# Patient Record
Sex: MALE | Race: WHITE | Employment: OTHER | URBAN - METROPOLITAN AREA
[De-identification: names, ages, dates, MRNs, and addresses within clinical notes are randomized per-mention and may not be internally consistent; named-entity substitution may affect disease eponyms.]

---

## 2017-01-16 ENCOUNTER — OFFICE VISIT (OUTPATIENT)
Dept: FAMILY MEDICINE CLINIC | Age: 72
End: 2017-01-16

## 2017-01-16 VITALS
RESPIRATION RATE: 18 BRPM | BODY MASS INDEX: 37.51 KG/M2 | HEIGHT: 70 IN | WEIGHT: 262 LBS | SYSTOLIC BLOOD PRESSURE: 128 MMHG | DIASTOLIC BLOOD PRESSURE: 72 MMHG | TEMPERATURE: 96.9 F | OXYGEN SATURATION: 96 % | HEART RATE: 80 BPM

## 2017-01-16 DIAGNOSIS — E78.2 MIXED HYPERLIPIDEMIA: ICD-10-CM

## 2017-01-16 DIAGNOSIS — M75.41 IMPINGEMENT SYNDROME OF SHOULDER REGION, RIGHT: Primary | ICD-10-CM

## 2017-01-16 DIAGNOSIS — F51.01 PRIMARY INSOMNIA: ICD-10-CM

## 2017-01-16 RX ORDER — DOXEPIN HYDROCHLORIDE 25 MG/1
25 CAPSULE ORAL
Qty: 90 CAP | Refills: 0 | Status: SHIPPED | OUTPATIENT
Start: 2017-01-16 | End: 2017-05-31

## 2017-01-16 RX ORDER — ACETAMINOPHEN 500 MG
TABLET ORAL
COMMUNITY

## 2017-01-16 RX ORDER — SIMVASTATIN 20 MG/1
20 TABLET, FILM COATED ORAL
Qty: 90 TAB | Refills: 1 | Status: SHIPPED | OUTPATIENT
Start: 2017-01-16 | End: 2018-01-15 | Stop reason: SDUPTHER

## 2017-01-16 NOTE — PROGRESS NOTES
1. Have you been to the ER, urgent care clinic since your last visit? Hospitalized since your last visit? No   2. Have you seen or consulted any other health care providers outside of the [facility] since your last visit? Include any pap smears or colon screening.    No      Last eye exam 3 years ago

## 2017-01-16 NOTE — PROGRESS NOTES
HISTORY OF PRESENT ILLNESS  Mamta Pedro is a 66 y.o. male. HPI  C/o right shoulder pain is not better since last visit, now both shoulders hurts with activity for 3 weeks, no pain at rest, pain with activity is 6-9/10 depends what he does, he tried voltaren but it did not help    Insomnia, stable on Doxipen prn    HLD, stable on zocor daily, last LDl was 69, need refills  Review of Systems   Constitutional: Negative for chills and fever. Respiratory: Negative for cough and shortness of breath. Cardiovascular: Negative for chest pain and leg swelling. Musculoskeletal: Negative for falls and neck pain. Skin: Negative for rash. Neurological: Negative for dizziness, tingling and headaches. Physical Exam   Constitutional: He is oriented to person, place, and time. He appears well-developed and well-nourished. Cardiovascular: Normal rate, regular rhythm and normal heart sounds. Pulmonary/Chest: Effort normal and breath sounds normal.   Abdominal: Soft. There is no tenderness. Musculoskeletal:        Right shoulder: He exhibits decreased range of motion and tenderness (abduction is limited to 90 degrees due to pain, emty can sign positive, drop arm negative). Left shoulder: He exhibits tenderness (anterior/lateral, empty can sign positive, abduction to more than 90 degrees cause pain, drop arm test negative). He exhibits normal range of motion and no deformity. Neurological: He is alert and oriented to person, place, and time. Skin: No rash noted. Psychiatric: He has a normal mood and affect. His behavior is normal. Judgment and thought content normal.   Vitals reviewed. ASSESSMENT and PLAN  Rudi Kawasaki was seen today for shoulder pain and follow up chronic condition.     Diagnoses and all orders for this visit:    Impingement syndrome of shoulder region, right, discussed options with pt, he wanted steroid injection  -     METHYLPREDNISOLONE ACETATE INJECTION 80 MG  -     DRAIN/INJECT LARGE JOINT/BURSA  - using sterile fashion, the right subacromial space was injected with 80 mg depomedrol/2 cc lidocaine, pt tolerated procedure well, pain level improved after the procedure to 4/10    [facility]  OFFICE PROCEDURE PROGRESS NOTE        Chart reviewed for the following:   Manuel Feldman MD, have reviewed the History, Physical and updated the Allergic reactions for [address] performed immediately prior to start of procedure:   Manuel Feldman MD, have performed the following reviews on Maureen Ferrari prior to the start of the procedure:            * Patient was identified by name and date of birth   * Agreement on procedure being performed was verified  * Risks and Benefits explained to the patient  * Procedure site verified and marked as necessary  * Patient was positioned for comfort  * Consent was signed and verified     Date of procedure:1/16/2017   Time of procedure: 11:11 am  Procedure performed by:  Jason Damon MD    Provider assisted by: Saba    Patient assisted by: self    Pt tolerated the procedure well with no complications    Pt was advised to contact our office immediately if redness/pain or fever develops, Pt verbalized understanding        Primary insomnia, stable  -     doxepin (SINEQUAN) 25 mg capsule; Take 1 Cap by mouth nightly. Mixed hyperlipidemia, stable  -     simvastatin (ZOCOR) 20 mg tablet; Take 1 Tab by mouth nightly. -     LIPID PANEL; Future  -     METABOLIC PANEL, COMPREHENSIVE; Future  -     CBC WITH AUTOMATED DIFF;  Future    Lab Results   Component Value Date/Time    Cholesterol, total 151 09/28/2016 11:02 AM    HDL Cholesterol 48 09/28/2016 11:02 AM    LDL, calculated 69.2 09/28/2016 11:02 AM    VLDL, calculated 33.8 09/28/2016 11:02 AM    Triglyceride 169 09/28/2016 11:02 AM    CHOL/HDL Ratio 3.1 09/28/2016 11:02 AM

## 2017-01-16 NOTE — PATIENT INSTRUCTIONS
Joint Injections: Care Instructions  Your Care Instructions  Joint injections are shots into a joint, such as the knee. They may be used to put in medicines, such as pain relievers. Or they can be used to take out fluid. Sometimes the fluid is tested in a lab. This can help find the cause of a joint problem. A corticosteroid, or steroid, shot is used to reduce inflammation in tendons or joints. It is often used to treat problems such as arthritis, tendinitis, and bursitis. Steroids can be injected directly into a painful, inflamed joint. They can also help reduce inflammation of a bursa. A bursa is a sac of fluid. It cushions and lubricates areas where tendons, ligaments, skin, muscles, or bones rub against each other. A steroid shot can sometimes help with short-term pain relief when other treatments haven't worked. If steroid shots help, pain may improve for weeks or months. Follow-up care is a key part of your treatment and safety. Be sure to make and go to all appointments, and call your doctor if you are having problems. It's also a good idea to know your test results and keep a list of the medicines you take. How can you care for yourself at home? · Put ice or a cold pack on the area for 10 to 20 minutes at a time. Put a thin cloth between the ice and your skin. · Take anti-inflammatory medicines to reduce pain, swelling, or inflammation. These include ibuprofen (Advil, Motrin) and naproxen (Aleve). Read and follow all instructions on the label. · Avoid strenuous activities for several days, especially those that put stress on the area where you got the shot. · If you have dressings over the area, keep them clean and dry. You may remove them when your doctor tells you to. When should you call for help? Call your doctor now or seek immediate medical care if:  · You have signs of infection, such as:  ¨ Increased pain, swelling, warmth, or redness. ¨ Red streaks leading from the site.   ¨ Pus draining from the site. ¨ A fever. Watch closely for changes in your health, and be sure to contact your doctor if you have any problems. Where can you learn more? Go to http://mirian-roz.info/. Enter N616 in the search box to learn more about \"Joint Injections: Care Instructions. \"  Current as of: May 23, 2016  Content Version: 11.1  © 9498-0188 [employer]. Care instructions adapted under license by [employer] (which disclaims liability or warranty for this information). If you have questions about a medical condition or this instruction, always ask your healthcare professional. [address] any warranty or liability for your use of this information.

## 2017-01-16 NOTE — MR AVS SNAPSHOT
Visit Information Date & Time Provider Department Dept. Phone Encounter #  
 1/16/2017 10:45 AM Saroj Negron MD [facility] [phone] [id number] Your Appointments 1/31/2017 11:00 AM  
Office Visit with Saroj Negron MD  
[facility] [address] [facility]) Appt Note:   
 [address] [facility] [address] [place] [id number]  
[phone]  
  
   
 [address] 4/26/2017  9:15 AM  
ESTABLISHED PATIENT with Adeola Treviño MD  
[facility] [place] Appt Note: Return in about 6 months (around 4/26/2017) for Flow/PVR, BPH  
 [address] [facility] [address] [address]  
[phone]  
  
   
 [facility] [id number] Upcoming Health Maintenance Date Due DTaP/Tdap/Td series (1 - Tdap) 11/27/1965 FOBT Q 1 YEAR AGE 50-75 11/27/1994 ZOSTER VACCINE AGE 60> 11/27/2004 GLAUCOMA SCREENING Q2Y 11/27/2009 MEDICARE YEARLY EXAM 11/27/2009 INFLUENZA AGE 9 TO ADULT 8/1/2016 Pneumococcal 65+ Low/Medium Risk (2 of 2 - PPSV23) 10/26/2017 Allergies as of 1/16/2017  Review Complete On: 1/16/2017 By: Saroj Negron MD  
  
 Severity Noted Reaction Type Reactions Aspirin High 09/23/2016   Intolerance Rash Penicillins High 09/23/2016   Intolerance Unknown (comments) Peanut Medium 09/23/2016   Topical Itching Current Immunizations  Never Reviewed No immunizations on file. Not reviewed this visit You Were Diagnosed With   
  
 Codes Comments Impingement syndrome of shoulder region, right    -  Primary ICD-10-CM: M75.41 
ICD-9-CM: 726.2 Primary insomnia     ICD-10-CM: F51.01 
ICD-9-CM: 307.42 Mixed hyperlipidemia     ICD-10-CM: E78.2 ICD-9-CM: 272.2 Vitals BP Pulse Temp Resp Height(growth percentile) Weight(growth percentile) 128/72 (BP 1 Location: Left arm, BP Patient Position: Sitting) 80 96.9 °F (36.1 °C) (Oral) 18 5' 10\" (1.778 m) 262 lb (118.8 kg) SpO2 BMI Smoking Status 96% 37.59 kg/m2 Light Tobacco Smoker Vitals History BMI and BSA Data Body Mass Index Body Surface Area  
 37.59 kg/m 2 2.42 m 2 Preferred Pharmacy Pharmacy Name Phone [facility] [address] [place] [address],  [place] Your Updated Medication List  
  
   
This list is accurate as of: 1/16/17 11:24 AM.  Always use your most recent med list.  
  
  
  
  
 CENTRUM COMPLETE PO Take  by mouth. diclofenac EC 75 mg EC tablet Commonly known as:  VOLTAREN Take 1 Tab by mouth two (2) times a day. doxepin 25 mg capsule Commonly known as:  SINEquan Take 1 Cap by mouth nightly. simvastatin 20 mg tablet Commonly known as:  ZOCOR Take 1 Tab by mouth nightly. tamsulosin 0.4 mg capsule Commonly known as:  FLOMAX Take 0.4 mg by mouth daily. TYLENOL EXTRA STRENGTH 500 mg tablet Generic drug:  acetaminophen Take  by mouth every six (6) hours as needed for Pain. Prescriptions Sent to Pharmacy Refills  
 doxepin (SINEQUAN) 25 mg capsule 0 Sig: Take 1 Cap by mouth nightly. Class: Normal  
 Pharmacy: [address] [place] [address], [facility] [address] Ph #: [phone] Route: Oral  
 simvastatin (ZOCOR) 20 mg tablet 1 Sig: Take 1 Tab by mouth nightly. Class: Normal  
 Pharmacy: [address] [place] [address] [facility] Ph #: [phone] Route: Oral  
  
We Performed the Following DRAIN/INJECT LARGE JOINT/BURSA [id number] CPT(R)] METHYLPREDNISOLONE ACETATE INJECTION 80 MG [ [facility]] To-Do List   
 01/16/2017 Lab:  CBC WITH AUTOMATED DIFF   
  
 01/16/2017 Lab:  LIPID PANEL   
  
 01/16/2017 Lab:  METABOLIC PANEL, COMPREHENSIVE Patient Instructions Joint Injections: Care Instructions Your Care Instructions Joint injections are shots into a joint, such as the knee. They may be used to put in medicines, such as pain relievers. Or they can be used to take out fluid. Sometimes the fluid is tested in a lab. This can help find the cause of a joint problem. A corticosteroid, or steroid, shot is used to reduce inflammation in tendons or joints. It is often used to treat problems such as arthritis, tendinitis, and bursitis. Steroids can be injected directly into a painful, inflamed joint. They can also help reduce inflammation of a bursa. A bursa is a sac of fluid. It cushions and lubricates areas where tendons, ligaments, skin, muscles, or bones rub against each other. A steroid shot can sometimes help with short-term pain relief when other treatments haven't worked. If steroid shots help, pain may improve for weeks or months. Follow-up care is a key part of your treatment and safety. Be sure to make and go to all appointments, and call your doctor if you are having problems. It's also a good idea to know your test results and keep a list of the medicines you take. How can you care for yourself at home? · Put ice or a cold pack on the area for 10 to 20 minutes at a time. Put a thin cloth between the ice and your skin. · Take anti-inflammatory medicines to reduce pain, swelling, or inflammation. These include ibuprofen (Advil, Motrin) and naproxen (Aleve). Read and follow all instructions on the label. · Avoid strenuous activities for several days, especially those that put stress on the area where you got the shot. · If you have dressings over the area, keep them clean and dry. You may remove them when your doctor tells you to. When should you call for help? Call your doctor now or seek immediate medical care if: 
· You have signs of infection, such as: 
¨ Increased pain, swelling, warmth, or redness. ¨ Red streaks leading from the site. ¨ Pus draining from the site. ¨ A fever. Watch closely for changes in your health, and be sure to contact your doctor if you have any problems. Where can you learn more? Go to http://mirian-roz.info/. Enter N616 in the search box to learn more about \"Joint Injections: Care Instructions. \" Current as of: May 23, 2016 Content Version: 11.1 © 4856-8949 [employer]. Care instructions adapted under license by [employer] (which disclaims liability or warranty for this information). If you have questions about a medical condition or this instruction, always ask your healthcare professional. [address] any warranty or liability for your use of this information. Introducing [facility]! Francis Desir introduces zumatek patient portal. Now you can access parts of your medical record, email your doctor's office, and request medication refills online. 1. In your internet browser, go to [email or web address] 2. Click on the First Time User? Click Here link in the Sign In box. You will see the New Member Sign Up page. 3. Enter your zumatek Access Code exactly as it appears below. You will not need to use this code after youve completed the sign-up process. If you do not sign up before the expiration date, you must request a new code. · zumatek Access Code: [email or web address] Expires: 4/16/2017 11:24 AM 
 
4. Enter the last four digits of your Social Security Number (xxxx) and Date of Birth (mm/dd/yyyy) as indicated and click Submit. You will be taken to the next sign-up page. 5. Create a zumatek ID. This will be your zumatek login ID and cannot be changed, so think of one that is secure and easy to remember. 6. Create a zumatek password. You can change your password at any time. 7. Enter your Password Reset Question and Answer. This can be used at a later time if you forget your password. 8. Enter your e-mail address. You will receive e-mail notification when new information is available in [address] 9. Click Sign Up. You can now view and download portions of your medical record. 10. Click the Download Summary menu link to download a portable copy of your medical information. If you have questions, please visit the Frequently Asked Questions section of the [employer] website. Remember, [employer] is NOT to be used for urgent needs. For medical emergencies, dial 911. Now available from your iPhone and Android! Please provide this summary of care documentation to your next provider. Your primary care clinician is listed as Niko Vallejo. If you have any questions after today's visit, please call [phone].

## 2017-01-23 ENCOUNTER — HOSPITAL ENCOUNTER (OUTPATIENT)
Dept: LAB | Age: 72
Discharge: HOME | End: 2017-01-23
Payer: MEDICARE

## 2017-01-23 DIAGNOSIS — E78.2 MIXED HYPERLIPIDEMIA: ICD-10-CM

## 2017-01-23 LAB
ALBUMIN SERPL BCP-MCNC: 3.7 G/DL (ref 3.4–5)
ALBUMIN/GLOB SERPL: 1.4 {RATIO} (ref 0.8–1.7)
ALP SERPL-CCNC: 81 U/L (ref 45–117)
ALT SERPL-CCNC: 20 U/L (ref 16–61)
ANION GAP BLD CALC-SCNC: 8 MMOL/L (ref 3–18)
AST SERPL W P-5'-P-CCNC: 10 U/L (ref 15–37)
BASOPHILS # BLD AUTO: 0 K/UL (ref 0–0.06)
BASOPHILS # BLD: 0 % (ref 0–2)
BILIRUB SERPL-MCNC: 0.4 MG/DL (ref 0.2–1)
BUN SERPL-MCNC: 14 MG/DL (ref 7–18)
BUN/CREAT SERPL: 17 (ref 12–20)
CALCIUM SERPL-MCNC: 9 MG/DL (ref 8.5–10.1)
CHLORIDE SERPL-SCNC: 106 MMOL/L (ref 100–108)
CHOLEST SERPL-MCNC: 148 MG/DL
CO2 SERPL-SCNC: 26 MMOL/L (ref 21–32)
CREAT SERPL-MCNC: 0.82 MG/DL (ref 0.6–1.3)
DIFFERENTIAL METHOD BLD: ABNORMAL
EOSINOPHIL # BLD: 0.1 K/UL (ref 0–0.4)
EOSINOPHIL NFR BLD: 1 % (ref 0–5)
ERYTHROCYTE [DISTWIDTH] IN BLOOD BY AUTOMATED COUNT: 13.3 % (ref 11.6–14.5)
GLOBULIN SER CALC-MCNC: 2.6 G/DL (ref 2–4)
GLUCOSE SERPL-MCNC: 81 MG/DL (ref 74–99)
HCT VFR BLD AUTO: 49.7 % (ref 36–48)
HDLC SERPL-MCNC: 59 MG/DL (ref 40–60)
HDLC SERPL: 2.5 {RATIO} (ref 0–5)
HGB BLD-MCNC: 16.8 G/DL (ref 13–16)
LDLC SERPL CALC-MCNC: 72 MG/DL (ref 0–100)
LIPID PROFILE,FLP: NORMAL
LYMPHOCYTES # BLD AUTO: 29 % (ref 21–52)
LYMPHOCYTES # BLD: 2.3 K/UL (ref 0.9–3.6)
MCH RBC QN AUTO: 33.1 PG (ref 24–34)
MCHC RBC AUTO-ENTMCNC: 33.8 G/DL (ref 31–37)
MCV RBC AUTO: 97.8 FL (ref 74–97)
MONOCYTES # BLD: 0.7 K/UL (ref 0.05–1.2)
MONOCYTES NFR BLD AUTO: 8 % (ref 3–10)
NEUTS SEG # BLD: 4.8 K/UL (ref 1.8–8)
NEUTS SEG NFR BLD AUTO: 62 % (ref 40–73)
PLATELET # BLD AUTO: 250 K/UL (ref 135–420)
PMV BLD AUTO: 10.6 FL (ref 9.2–11.8)
POTASSIUM SERPL-SCNC: 4.1 MMOL/L (ref 3.5–5.5)
PROT SERPL-MCNC: 6.3 G/DL (ref 6.4–8.2)
RBC # BLD AUTO: 5.08 M/UL (ref 4.7–5.5)
SODIUM SERPL-SCNC: 140 MMOL/L (ref 136–145)
TRIGL SERPL-MCNC: 85 MG/DL (ref ?–150)
VLDLC SERPL CALC-MCNC: 17 MG/DL
WBC # BLD AUTO: 7.8 K/UL (ref 4.6–13.2)

## 2017-01-23 PROCEDURE — 80053 COMPREHEN METABOLIC PANEL: CPT | Performed by: INTERNAL MEDICINE

## 2017-01-23 PROCEDURE — 80061 LIPID PANEL: CPT | Performed by: INTERNAL MEDICINE

## 2017-01-23 PROCEDURE — 85025 COMPLETE CBC W/AUTO DIFF WBC: CPT | Performed by: INTERNAL MEDICINE

## 2017-01-23 PROCEDURE — 36415 COLL VENOUS BLD VENIPUNCTURE: CPT | Performed by: INTERNAL MEDICINE

## 2017-01-31 ENCOUNTER — OFFICE VISIT (OUTPATIENT)
Dept: FAMILY MEDICINE CLINIC | Age: 72
End: 2017-01-31

## 2017-01-31 VITALS
BODY MASS INDEX: 37.51 KG/M2 | WEIGHT: 262 LBS | SYSTOLIC BLOOD PRESSURE: 116 MMHG | DIASTOLIC BLOOD PRESSURE: 77 MMHG | OXYGEN SATURATION: 95 % | RESPIRATION RATE: 18 BRPM | TEMPERATURE: 98.1 F | HEART RATE: 75 BPM | HEIGHT: 70 IN

## 2017-01-31 DIAGNOSIS — Z13.39 SCREENING FOR ALCOHOLISM: ICD-10-CM

## 2017-01-31 DIAGNOSIS — M25.512 LEFT SHOULDER PAIN, UNSPECIFIED CHRONICITY: ICD-10-CM

## 2017-01-31 DIAGNOSIS — Z23 ENCOUNTER FOR IMMUNIZATION: ICD-10-CM

## 2017-01-31 DIAGNOSIS — Z13.5 GLAUCOMA SCREENING: ICD-10-CM

## 2017-01-31 DIAGNOSIS — Z00.00 ROUTINE GENERAL MEDICAL EXAMINATION AT A HEALTH CARE FACILITY: Primary | ICD-10-CM

## 2017-01-31 NOTE — PROGRESS NOTES
This is a Subsequent Medicare Annual Wellness Visit providing Personalized Prevention Plan Services (PPPS) (Performed 12 months after initial AWV and PPPS )    I have reviewed the patient's medical history in detail and updated the computerized patient record. History     Past Medical History   Diagnosis Date    Allergic rhinitis due to allergen     Benign prostatic hypertrophy (BPH) with nocturia     BPH (benign prostatic hyperplasia)     Dermatitis     Hypercholesterolemia     Mixed hyperlipidemia     Primary insomnia     Weak urinary stream       Past Surgical History   Procedure Laterality Date    Hx orthopaedic       knee    Hx heent       wisdom    Hx heent       tonsillectomy     Current Outpatient Prescriptions   Medication Sig Dispense Refill    acetaminophen (TYLENOL EXTRA STRENGTH) 500 mg tablet Take  by mouth every six (6) hours as needed for Pain.  simvastatin (ZOCOR) 20 mg tablet Take 1 Tab by mouth nightly. 90 Tab 1    diclofenac EC (VOLTAREN) 75 mg EC tablet Take 1 Tab by mouth two (2) times a day. 60 Tab 0    MULTIVITAMIN/IRON/FOLIC ACID (CENTRUM COMPLETE PO) Take  by mouth.  tamsulosin (FLOMAX) 0.4 mg capsule Take 0.4 mg by mouth daily.  doxepin (SINEQUAN) 25 mg capsule Take 1 Cap by mouth nightly.  90 Cap 0     Allergies   Allergen Reactions    Aspirin Rash    Penicillins Unknown (comments)    Peanut Itching     Family History   Problem Relation Age of Onset    Diabetes Mother     Glaucoma Mother     Diabetes Brother      Social History   Substance Use Topics    Smoking status: Light Tobacco Smoker     Packs/day: 0.25    Smokeless tobacco: Never Used    Alcohol use Yes      Comment: occasional     Patient Active Problem List   Diagnosis Code    Benign non-nodular prostatic hyperplasia without lower urinary tract symptoms N40.0    Mixed hyperlipidemia E78.2    Primary osteoarthritis of both knees M17.0       Depression Risk Factor Screening:     PHQ 2 / 9, over the last two weeks 1/31/2017   Little interest or pleasure in doing things Not at all   Feeling down, depressed or hopeless Not at all   Total Score PHQ 2 0     Alcohol Risk Factor Screening: On any occasion during the past 3 months, have you had more than 4 drinks containing alcohol? No    Do you average more than 14 drinks per week? No      Functional Ability and Level of Safety:     Hearing Loss   no    Activities of Daily Living   Self-care. Requires assistance with: no ADLs    Fall Risk     Fall Risk Assessment, last 12 mths 1/31/2017   Able to walk? Yes   Fall in past 12 months? No     Abuse Screen   Patient is not abused    Review of Systems       Physical Examination     Evaluation of Cognitive Function:  Mood/affect:  happy  Appearance: age appropriate, casually dressed and within normal Limits  Family member/caregiver input:         Patient Care Team:  Viviana Cardona MD as PCP - General (Internal Medicine)    Advice/Referrals/Counseling   Education and counseling provided:  Are appropriate based on today's review and evaluation  End-of-Life planning (with patient's consent), discussed AMD, pt will discuss with his wife and complete later  Pneumococcal Vaccine, Rx given for Prevnar  Influenza Vaccine, up to date  Screening for glaucoma, ref to [facility]    Assessment/Plan   Ingris Colby was seen today for annual wellness visit. Diagnoses and all orders for this visit:    Glaucoma screening  -     REFERRAL TO OPHTHALMOLOGY    Routine general medical examination at a health care facility    Screening for alcoholism    Encounter for immunization  -     pneumococcal 13 vicky conj dip (PREVNAR 13, PF,) 0.5 mL syrg injection; 0.5 mL by IntraMUSCular route once for 1 dose.  -     varicella zoster vacine live (ZOSTAVAX) 19,400 unit/0.65 mL susr injection; 1 Vial by SubCUTAneous route once for 1 dose.     Pt c/o left shoulder pain, he was given injection in the right shoulder 2 weeks ago which helped, he has no pain in the right shoulder, he wants injection in the left shoulder now, his pain is 10/10, much worse with raising his arm up    Left shoulder: He exhibits tenderness (anterior/lateral, empty can sign positive, abduction to more than 90 degrees cause pain, drop arm test negative). Left shoulder pain, unspecified chronicity, 2/2 impingement syndrome    -     METHYLPREDNISOLONE ACETATE INJECTION 80 MG  -     DRAIN/INJECT LARGE JOINT/BURSA    .using sterile fashion the left subacromial space was injected with 80 mg depomedrol/2 cc lidocaine , pt tolerated procedure well      [facility]  OFFICE PROCEDURE PROGRESS NOTE        Chart reviewed for the following:   Corey Zheng MD, have reviewed the History, Physical and updated the Allergic reactions for [address] performed immediately prior to start of procedure:   Corey Zheng MD, have performed the following reviews on Benjamin Salinas prior to the start of the procedure:            * Patient was identified by name and date of birth   * Agreement on procedure being performed was verified  * Risks and Benefits explained to the patient  * Procedure site verified and marked as necessary  * Patient was positioned for comfort  * Consent was signed and verified     Date of procedure:1/31/2017   Time of procedure: 12:15 pm  Procedure performed by:  Anne Howard MD    Provider assisted by: Ann Mukherjee    Patient assisted by: self    Pt tolerated the procedure well with no complications, his pain level improved to 2/10 after the injection.         Pt was advised to contact our office immediately if redness/pain or fever develops, Pt verbalized understanding

## 2017-01-31 NOTE — MR AVS SNAPSHOT
Visit Information Date & Time Provider Department Dept. Phone Encounter #  
 1/31/2017 11:00 AM Winston Bird, 3 [facility] [phone] [id number] Follow-up Instructions Return in about 3 months (around 4/30/2017). Your Appointments 4/26/2017  9:15 AM  
ESTABLISHED PATIENT with Steve Wright MD  
[facility] [place] [id number] ([address]) Appt Note: Return in about 6 months (around 4/26/2017) for Flow/PVR, BPH  
 [address] [facility] [address] [facility] [id number]  
  
    
 5/31/2017  9:30 AM  
Follow Up with Winston Bird MD  
[address]) Appt Note: 4 month f/u appt [address] [facility] [address]  
[phone]  
  
   
 [address] Upcoming Health Maintenance Date Due ZOSTER VACCINE AGE 60> 11/27/2004 GLAUCOMA SCREENING Q2Y 11/27/2009 MEDICARE YEARLY EXAM 11/27/2009 Pneumococcal 65+ Low/Medium Risk (2 of 2 - PPSV23) 10/26/2017 COLONOSCOPY 1/7/2018 DTaP/Tdap/Td series (2 - Td) 1/5/2019 Allergies as of 1/31/2017  Review Complete On: 1/31/2017 By: Winston Bird MD  
  
 Severity Noted Reaction Type Reactions Aspirin High 09/23/2016   Intolerance Rash Penicillins High 09/23/2016   Intolerance Unknown (comments) Peanut Medium 09/23/2016   Topical Itching Current Immunizations  Never Reviewed No immunizations on file. Not reviewed this visit You Were Diagnosed With   
  
 Codes Comments Routine general medical examination at a health care facility    -  Primary ICD-10-CM: Z00.00 ICD-9-CM: V70.0 Glaucoma screening     ICD-10-CM: Z13.5 ICD-9-CM: V80.1 Screening for alcoholism     ICD-10-CM: Z13.89 ICD-9-CM: V79.1 Encounter for immunization     ICD-10-CM: G95 ICD-9-CM: V03.89   
 Left shoulder pain, unspecified chronicity     ICD-10-CM: M25.512 ICD-9-CM: 719.41 Vitals BP Pulse Temp Resp Height(growth percentile) Weight(growth percentile) 116/77 (BP 1 Location: Right arm, BP Patient Position: Sitting) 75 98.1 °F (36.7 °C) (Oral) 18 5' 10\" (1.778 m) 262 lb (118.8 kg) SpO2 BMI Smoking Status 95% 37.59 kg/m2 Light Tobacco Smoker Vitals History BMI and BSA Data Body Mass Index Body Surface Area  
 37.59 kg/m 2 2.42 m 2 Preferred Pharmacy Pharmacy Name Phone [facility] [address] [facility], [address] Your Updated Medication List  
  
   
This list is accurate as of: 17  1:19 PM.  Always use your most recent med list.  
  
  
  
  
 CENTRUM COMPLETE PO Take  by mouth. diclofenac EC 75 mg EC tablet Commonly known as:  VOLTAREN Take 1 Tab by mouth two (2) times a day. doxepin 25 mg capsule Commonly known as:  SINEquan Take 1 Cap by mouth nightly. pneumococcal 13 vicky conj dip 0.5 mL Syrg injection Commonly known as:  PREVNAR 13 (PF)  
0.5 mL by IntraMUSCular route once for 1 dose. simvastatin 20 mg tablet Commonly known as:  ZOCOR Take 1 Tab by mouth nightly. tamsulosin 0.4 mg capsule Commonly known as:  FLOMAX Take 0.4 mg by mouth daily. TYLENOL EXTRA STRENGTH 500 mg tablet Generic drug:  acetaminophen Take  by mouth every six (6) hours as needed for Pain.  
  
 varicella zoster vacine live 19,400 unit/0.65 mL Susr injection Commonly known as:  ZOSTAVAX  
1 Vial by SubCUTAneous route once for 1 dose. Prescriptions Printed Refills  
 pneumococcal 13 vicky conj dip (PREVNAR 13, PF,) 0.5 mL syrg injection 0 Si.5 mL by IntraMUSCular route once for 1 dose. Class: Print  Route: IntraMUSCular  
 varicella zoster vacine live (ZOSTAVAX) 19,400 unit/0.65 mL susr injection 0  
 Si Vial by SubCUTAneous route once for 1 dose. Class: Print Route: SubCUTAneous We Performed the Following DRAIN/INJECT LARGE JOINT/BURSA [id number] CPT(R)] METHYLPREDNISOLONE ACETATE INJECTION 80 MG [ [facility]] REFERRAL TO OPHTHALMOLOGY [REF57 Custom] Follow-up Instructions Return in about 3 months (around 2017). Referral Information Referral ID Referred By Referred To  
  
 [id number] Delio BALL [facility] [address] [facility] [address] [facility] Phone: [phone] Fax: [phone] Visits Status Start Date End Date 1 New Request 17 If your referral has a status of pending review or denied, additional information will be sent to support the outcome of this decision. Patient Instructions Medicare Wellness Visit, Male The best way to live healthy is to have a healthy lifestyle by eating a well-balanced diet, exercising regularly, limiting alcohol and stopping smoking. Regular physical exams and screening tests are another way to keep healthy. Preventive exams provided by your health care provider can find health problems before they become diseases or illnesses. Preventive services including immunizations, screening tests, monitoring and exams can help you take care of your own health. All people over age 72 should have a pneumovax  and and a prevnar shot to prevent pneumonia. These are once in a lifetime unless you and your provider decide differently. All people over 65 should have a yearly flu shot and a tetanus vaccine every 10 years. Screening for diabetes mellitus with a blood sugar test should be done every year.  
 
Glaucoma is a disease of the eye due to increased ocular pressure that can lead to blindness and it should be done every year by an eye professional. 
 
Cardiovascular screening tests that check for elevated lipids (fatty part of blood) which can lead to heart disease and strokes should be done every 5 years. Colorectal screening that evaluates for blood or polyps in your colon should be done yearly as a stool test or every five years as a flexible sigmoidoscope or every 10 years as a colonoscopy up to age 76. Men up to age 76 may need a screening blood test for prostate cancer at certain intervals, depending on their personal and family history. This decision is between the patient and his provider. If you have been a smoker or had family history of abdominal aortic aneurysms, you and your provider may decide to schedule an ultrasound test of your aorta. Hepatitis C screening is also recommended for anyone born between [birth year] through [address]. A shingles vaccine is also recommended once in a lifetime after age 61. Your Medicare Wellness Exam is recommended annually. Here is a list of your current Health Maintenance items with a due date: 
Health Maintenance Due Topic Date Due  Shingles Vaccine please get at the pharmacy 11/27/2004  Glaucoma Screening , referral to Dr Massiel parra 11/27/2009 Kristopher Torie Annual Well Visit   Joint Injections: Care Instructions Your Care Instructions Joint injections are shots into a joint, such as the knee. They may be used to put in medicines, such as pain relievers. Or they can be used to take out fluid. Sometimes the fluid is tested in a lab. This can help find the cause of a joint problem. A corticosteroid, or steroid, shot is used to reduce inflammation in tendons or joints. It is often used to treat problems such as arthritis, tendinitis, and bursitis. Steroids can be injected directly into a painful, inflamed joint. They can also help reduce inflammation of a bursa. A bursa is a sac of fluid. It cushions and lubricates areas where tendons, ligaments, skin, muscles, or bones rub against each other. A steroid shot can sometimes help with short-term pain relief when other treatments haven't worked. If steroid shots help, pain may improve for weeks or months. Follow-up care is a key part of your treatment and safety. Be sure to make and go to all appointments, and call your doctor if you are having problems. It's also a good idea to know your test results and keep a list of the medicines you take. How can you care for yourself at home? · Put ice or a cold pack on the area for 10 to 20 minutes at a time. Put a thin cloth between the ice and your skin. · Take anti-inflammatory medicines to reduce pain, swelling, or inflammation. These include ibuprofen (Advil, Motrin) and naproxen (Aleve). Read and follow all instructions on the label. · Avoid strenuous activities for several days, especially those that put stress on the area where you got the shot. · If you have dressings over the area, keep them clean and dry. You may remove them when your doctor tells you to. When should you call for help? Call your doctor now or seek immediate medical care if: 
· You have signs of infection, such as: 
¨ Increased pain, swelling, warmth, or redness. ¨ Red streaks leading from the site. ¨ Pus draining from the site. ¨ A fever. Watch closely for changes in your health, and be sure to contact your doctor if you have any problems. Where can you learn more? Go to http://mirian-roz.info/. Enter N616 in the search box to learn more about \"Joint Injections: Care Instructions. \" Current as of: May 23, 2016 Content Version: 11.1 © 7201-4498 [employer] [place] Care instructions adapted under license by [employer] (which disclaims liability or warranty for this information). If you have questions about a medical condition or this instruction, always ask your healthcare professional. [address] any warranty or liability for your use of this information. Introducing [facility]! Wilfred Frances introduces Colatris patient portal. Now you can access parts of your medical record, email your doctor's office, and request medication refills online. 1. In your internet browser, go to https://[employer]. [employer]/Anxat 2. Click on the First Time User? Click Here link in the Sign In box. You will see the New Member Sign Up page. 3. Enter your Colatris Access Code exactly as it appears below. You will not need to use this code after youve completed the sign-up process. If you do not sign up before the expiration date, you must request a new code. · Colatris Access Code: [email or web address] Expires: 4/16/2017 11:24 AM 
 
4. Enter the last four digits of your Social Security Number (xxxx) and Date of Birth (mm/dd/yyyy) as indicated and click Submit. You will be taken to the next sign-up page. 5. Create a Colatris ID. This will be your Colatris login ID and cannot be changed, so think of one that is secure and easy to remember. 6. Create a Colatris password. You can change your password at any time. 7. Enter your Password Reset Question and Answer. This can be used at a later time if you forget your password. 8. Enter your e-mail address. You will receive e-mail notification when new information is available in [address] 9. Click Sign Up. You can now view and download portions of your medical record. 10. Click the Download Summary menu link to download a portable copy of your medical information. If you have questions, please visit the Frequently Asked Questions section of the Colatris website. Remember, Colatris is NOT to be used for urgent needs. For medical emergencies, dial 911. Now available from your iPhone and Android! Please provide this summary of care documentation to your next provider. Your primary care clinician is listed as Chaitanya Owusus. If you have any questions after today's visit, please call [phone].

## 2017-01-31 NOTE — PATIENT INSTRUCTIONS
Medicare Wellness Visit, Male    The best way to live healthy is to have a healthy lifestyle by eating a well-balanced diet, exercising regularly, limiting alcohol and stopping smoking. Regular physical exams and screening tests are another way to keep healthy. Preventive exams provided by your health care provider can find health problems before they become diseases or illnesses. Preventive services including immunizations, screening tests, monitoring and exams can help you take care of your own health. All people over age 72 should have a pneumovax  and and a prevnar shot to prevent pneumonia. These are once in a lifetime unless you and your provider decide differently. All people over 65 should have a yearly flu shot and a tetanus vaccine every 10 years. Screening for diabetes mellitus with a blood sugar test should be done every year. Glaucoma is a disease of the eye due to increased ocular pressure that can lead to blindness and it should be done every year by an eye professional.    Cardiovascular screening tests that check for elevated lipids (fatty part of blood) which can lead to heart disease and strokes should be done every 5 years. Colorectal screening that evaluates for blood or polyps in your colon should be done yearly as a stool test or every five years as a flexible sigmoidoscope or every 10 years as a colonoscopy up to age 76. Men up to age 76 may need a screening blood test for prostate cancer at certain intervals, depending on their personal and family history. This decision is between the patient and his provider. If you have been a smoker or had family history of abdominal aortic aneurysms, you and your provider may decide to schedule an ultrasound test of your aorta. Hepatitis C screening is also recommended for anyone born between [birth year] through [address]. A shingles vaccine is also recommended once in a lifetime after age 61.     Your Medicare Wellness Exam is recommended annually. Here is a list of your current Health Maintenance items with a due date:  Health Maintenance Due   Topic Date Due    Shingles Vaccine please get at the pharmacy 11/27/2004    Glaucoma Screening , referral to Dr Ximena parra 11/27/2009    Annual Well Visit            Joint Injections: Care Instructions  Your Care Instructions  Joint injections are shots into a joint, such as the knee. They may be used to put in medicines, such as pain relievers. Or they can be used to take out fluid. Sometimes the fluid is tested in a lab. This can help find the cause of a joint problem. A corticosteroid, or steroid, shot is used to reduce inflammation in tendons or joints. It is often used to treat problems such as arthritis, tendinitis, and bursitis. Steroids can be injected directly into a painful, inflamed joint. They can also help reduce inflammation of a bursa. A bursa is a sac of fluid. It cushions and lubricates areas where tendons, ligaments, skin, muscles, or bones rub against each other. A steroid shot can sometimes help with short-term pain relief when other treatments haven't worked. If steroid shots help, pain may improve for weeks or months. Follow-up care is a key part of your treatment and safety. Be sure to make and go to all appointments, and call your doctor if you are having problems. It's also a good idea to know your test results and keep a list of the medicines you take. How can you care for yourself at home? · Put ice or a cold pack on the area for 10 to 20 minutes at a time. Put a thin cloth between the ice and your skin. · Take anti-inflammatory medicines to reduce pain, swelling, or inflammation. These include ibuprofen (Advil, Motrin) and naproxen (Aleve). Read and follow all instructions on the label. · Avoid strenuous activities for several days, especially those that put stress on the area where you got the shot.   · If you have dressings over the area, keep them clean and dry. You may remove them when your doctor tells you to. When should you call for help? Call your doctor now or seek immediate medical care if:  · You have signs of infection, such as:  ¨ Increased pain, swelling, warmth, or redness. ¨ Red streaks leading from the site. ¨ Pus draining from the site. ¨ A fever. Watch closely for changes in your health, and be sure to contact your doctor if you have any problems. Where can you learn more? Go to http://mirian-roz.info/. Enter N616 in the search box to learn more about \"Joint Injections: Care Instructions. \"  Current as of: May 23, 2016  Content Version: 11.1  © 1595-0859 [employer]. Care instructions adapted under license by [employer] (which disclaims liability or warranty for this information). If you have questions about a medical condition or this instruction, always ask your healthcare professional. [place] [address] any warranty or liability for your use of this information.

## 2017-01-31 NOTE — PROGRESS NOTES
1. Have you been to the ER, urgent care clinic since your last visit? Hospitalized since your last visit? No    2. Have you seen or consulted any other health care providers outside of the [address] [place] [address] since your last visit? Include any pap smears or colon screening.  No

## 2017-03-09 ENCOUNTER — PATIENT OUTREACH (OUTPATIENT)
Dept: FAMILY MEDICINE CLINIC | Age: 72
End: 2017-03-09

## 2017-03-09 PROBLEM — I48.91 ATRIAL FIBRILLATION (HCC): Status: ACTIVE | Noted: 2017-03-06

## 2017-03-09 PROBLEM — J20.9 ACUTE BRONCHITIS: Status: ACTIVE | Noted: 2017-03-06

## 2017-03-09 RX ORDER — BENZONATATE 200 MG/1
200 CAPSULE ORAL 3 TIMES DAILY
COMMUNITY
Start: 2017-03-03 | End: 2017-05-31

## 2017-03-09 RX ORDER — ALBUTEROL SULFATE 90 UG/1
2 AEROSOL, METERED RESPIRATORY (INHALATION)
COMMUNITY
Start: 2017-03-03 | End: 2017-09-21

## 2017-03-09 RX ORDER — CODEINE PHOSPHATE AND GUAIFENESIN 10; 100 MG/5ML; MG/5ML
5-10 SOLUTION ORAL
COMMUNITY
Start: 2017-03-03 | End: 2017-05-18

## 2017-03-09 RX ORDER — FUROSEMIDE 20 MG/1
20 TABLET ORAL DAILY
COMMUNITY
Start: 2017-03-08 | End: 2017-12-01

## 2017-03-09 RX ORDER — FLUTICASONE PROPIONATE AND SALMETEROL 250; 50 UG/1; UG/1
1 POWDER RESPIRATORY (INHALATION) 2 TIMES DAILY
COMMUNITY
Start: 2017-03-08 | End: 2017-05-18

## 2017-03-09 RX ORDER — PREDNISONE 20 MG/1
20 TABLET ORAL AS DIRECTED
COMMUNITY
Start: 2017-03-08 | End: 2017-05-18

## 2017-03-09 RX ORDER — IBUPROFEN 200 MG
1 TABLET ORAL EVERY 24 HOURS
COMMUNITY
Start: 2017-03-08 | End: 2017-05-31

## 2017-03-09 RX ORDER — DOXYCYCLINE 100 MG/1
100 CAPSULE ORAL EVERY 12 HOURS
COMMUNITY
Start: 2017-03-08 | End: 2017-03-13

## 2017-03-09 NOTE — PROGRESS NOTES
.  Transitional Care Nurse Navigator Note:  Hospital Follow Up for [facility] Admission from 03/7 - 8/2017 for Cough ,AFIB . RRAT score: 18 Moderate  Medical History:     Past Medical History:   Diagnosis Date    Allergic rhinitis due to allergen     Benign prostatic hypertrophy (BPH) with nocturia     BPH (benign prostatic hyperplasia)     Dermatitis     Hypercholesterolemia     Mixed hyperlipidemia     Primary insomnia     Weak urinary stream        Patient presenting symptoms Atrial fibrillation Secondary diagnosis:Acute bronchitis     Admitted to Hospitalist Service    Course of current Hospitalization (referenced by Blair Henry MD - 03/08/2017 note):   Hospital Course: Patient presents with complaint of cough /weakness.  Pt is 66 year   Who is smoker ( no diagnosis of COPD per pt ), with PMH significant for  HLD, BPH,, who started having cough on Sunday , it got worse,  He develop runny nose/congestion, he was coughing so hard that he passed out at home once , he  Does has sputum sometimes clear sometimes white sometimes redish in color, he went to PF and was given steroids/nh/tessalon but his symptoms did not improve so he came to ED where he was found to be in afib and was send for admission ( no h/o cardiac arrythmia ) . He denies  similar episodes in past.    Patient was admitted to medical floor and was consulted by the cardiology as well as pulmonary group. Patient noted to have heart rate within normal range and his chads vascular score is 100 cardiology did not recommend any anti-coordination for this patient. He underwent for echocardiogram and patient remained in each fibrillation but rate controlled and the cardiology was agreeing for discharge and follow-up as an outpatient's. Patient was also started on oxygen and steroid therapy for her bronchitis which seems to be at baseline.  He is remained stable and was started on Advair low-dose and is twice to stop smoking and follow up with the baby from the group as an outpatient's. Medication Reconciliation completed:YES    This represents Transitions of Care because NN spoke with patient  within 1 business days of discharge. Pt's TCM follow up appt is scheduled with Dr. Ephraim Leventhal on Wednesday 3/15/2017 @ 3:45  which is within 7 days of discharge. Called patient on 3/9/2017 and verified with 2 identifiers. Patient went into details as to what lead him to the ED. Patient went to the Urgent care first but after 2 days of being dx with bronchitis ,he was not getting any better so he went to the ED @ [facility] and they admitted him because of his irregular heart beat and deep painful cough. Patient state's that he was a smoker and has now decided to quit. Hospitalist gave him Nicoderm patches ,but patient is refusing to use. States' \" I never smoked that much so I can quit without that\". Medication reconciliation completed all medications reviewed as to how to take. Patient verbalized he had to take the cough syrup last night for the coughing when he was laying down. No sob at that time.

## 2017-03-09 NOTE — Clinical Note
Patient was admitted to [facility] 3/7-8/2017 Cough; acute bronchitis.  Had to leave message for return call ,but patient has made his f/u  KRISTI HARVEY appointment for 3/7/2017 with the pact

## 2017-03-09 NOTE — PROGRESS NOTES
Called patient had to leave a message for return call. Patient has already set up his [facility] f/u appointment for 3/15/2017 @ 3:45 pm with Dr. Zach Betancourt.

## 2017-03-09 NOTE — PATIENT INSTRUCTIONS
Called patient had to leave a message for return call. Patient has already set up his [facility] f/u appointment for 3/15/2017 @ 3:45 pm with Dr. Mi Murray.

## 2017-03-09 NOTE — Clinical Note
Patient is scheduled for [facility] 3/15/2017 for new onset Atrial fibrillation Secondary diagnosis:Acute bronchitis

## 2017-03-13 ENCOUNTER — PATIENT OUTREACH (OUTPATIENT)
Dept: FAMILY MEDICINE CLINIC | Age: 72
End: 2017-03-13

## 2017-03-13 ENCOUNTER — OFFICE VISIT (OUTPATIENT)
Dept: FAMILY MEDICINE CLINIC | Age: 72
End: 2017-03-13

## 2017-03-13 VITALS
RESPIRATION RATE: 20 BRPM | DIASTOLIC BLOOD PRESSURE: 63 MMHG | HEIGHT: 70 IN | WEIGHT: 258 LBS | HEART RATE: 70 BPM | SYSTOLIC BLOOD PRESSURE: 108 MMHG | BODY MASS INDEX: 36.94 KG/M2 | OXYGEN SATURATION: 95 % | TEMPERATURE: 98 F

## 2017-03-13 DIAGNOSIS — J20.9 ACUTE BRONCHITIS, UNSPECIFIED ORGANISM: Primary | ICD-10-CM

## 2017-03-13 NOTE — PROGRESS NOTES
Arianna Hernandez is a 66 y.o. male here today for cough and not sleeping. 1. Have you been to the ER, urgent care clinic or hospitalized since your last visit? Yes     2. Have you seen or consulted any other health care providers outside of the [address] [place] [address] since your last visit (Include any pap smears or colon screening)? NO      Do you have an Advanced Directive? NO    Would you like information on Advanced Directives?  NO

## 2017-03-13 NOTE — MR AVS SNAPSHOT
Visit Information Date & Time Provider Department Dept. Phone Encounter #  
 3/13/2017  3:15 PM Ale Butler, [address] [facility] [phone] [id number] Your Appointments 4/26/2017  9:15 AM  
ESTABLISHED PATIENT with Mikala Scott MD  
[facility]. [address] ([address]) Appt Note: Return in about 6 months (around 4/26/2017) for Flow/PVR, BPH  
 [address] [place] [address] [address] [phone]  
  
    
 5/31/2017  9:30 AM  
Follow Up with Sharri Sanz MD  
[address] [facility] [address] [place] [address]) Appt Note: 4 month f/u appt [address] 2201 [facility] [id number] [phone]  
  
   
 [address] [facility] 280 [facility] 3/15/2017  3:45 PM  
TRANSITIONAL CARE MANAGEMENT with Sharri Sanz MD  
[address] [facility] [address] [place] [address]) Appt Note: a  
 [address] 2201 [facility] [address]  
[phone]  
  
   
 [address] [facility] 280 [facility] Upcoming Health Maintenance Date Due ZOSTER VACCINE AGE 60> 11/27/2004 GLAUCOMA SCREENING Q2Y 11/27/2009 COLONOSCOPY 1/7/2018 MEDICARE YEARLY EXAM 2/1/2018 DTaP/Tdap/Td series (2 - Td) 1/5/2019 Allergies as of 3/13/2017  Review Complete On: 3/13/2017 By: Galo Kilgore LPN Severity Noted Reaction Type Reactions Aspirin High 09/23/2016   Intolerance Rash Penicillins High 09/23/2016   Intolerance Unknown (comments) Peanut Medium 09/23/2016   Topical Itching Current Immunizations  Never Reviewed Name Date Influenza Vaccine 11/1/2016 12:00 AM  
 Pneumococcal Conjugate (PCV-13) 1/31/2017 Pneumococcal Polysaccharide (PPSV-23) 1/1/2013 12:00 AM  
  
 Not reviewed this visit You Were Diagnosed With   
  
 Codes Comments Acute bronchitis, unspecified organism    -  Primary ICD-10-CM: J20.9 ICD-9-CM: 466.0 Vitals BP Pulse Temp Resp Height(growth percentile) Weight(growth percentile) 108/63 (BP 1 Location: Left arm, BP Patient Position: Sitting) 70 98 °F (36.7 °C) (Oral) 20 5' 10\" (1.778 m) 258 lb (117 kg) SpO2 BMI Smoking Status 95% 37.02 kg/m2 Light Tobacco Smoker BMI and BSA Data Body Mass Index Body Surface Area 37.02 kg/m 2 2.4 m 2 Preferred Pharmacy Pharmacy Name Phone [facility] 9[address] [facility], [address] Your Updated Medication List  
  
   
This list is accurate as of: 3/13/17  3:41 PM.  Always use your most recent med list.  
  
  
  
  
 benzonatate 200 mg capsule Commonly known as:  TESSALON Take 200 mg by mouth three (3) times daily. CENTRUM COMPLETE PO Take  by mouth. diclofenac EC 75 mg EC tablet Commonly known as:  VOLTAREN Take 1 Tab by mouth two (2) times a day. doxepin 25 mg capsule Commonly known as:  SINEquan Take 1 Cap by mouth nightly. doxycycline 100 mg capsule Commonly known as:  Mark Hashimoto Take 100 mg by mouth every twelve (12) hours. For 5 days  
  
 fluticasone-salmeterol 250-50 mcg/dose diskus inhaler Commonly known as:  ADVAIR Take 1 Puff by inhalation two (2) times a day. furosemide 20 mg tablet Commonly known as:  LASIX Take 20 mg by mouth daily. guaiFENesin-codeine 100-10 mg/5 mL solution Commonly known as:  ROBITUSSIN AC Take 5-10 mL by mouth every six (6) hours as needed for Cough. menthol-camphor-benzyl alcohol 0.25-0.5-10 % Spra Apply 1 Applicator to affected area as needed for pain/local anesthesia. nicotine 21 mg/24 hr  
Commonly known as:  NICODERM CQ  
1 Patch by TransDERmal route every twenty-four (24) hours. Patient is refusing to use at this time  OTHER  
 Codeine/GG 10-100mg/5ml Guaifenesin-Codeine soln  1-2 tsp by mouth every 6 hours as needed for cough  
  
 predniSONE 20 mg tablet Commonly known as:  Monisha Gelineau Take 20 mg by mouth as directed. simvastatin 20 mg tablet Commonly known as:  ZOCOR Take 1 Tab by mouth nightly. tamsulosin 0.4 mg capsule Commonly known as:  FLOMAX Take 0.4 mg by mouth daily. TYLENOL EXTRA STRENGTH 500 mg tablet Generic drug:  acetaminophen Take  by mouth every six (6) hours as needed for Pain. VENTOLIN HFA 90 mcg/actuation inhaler Generic drug:  albuterol Take 2 Puffs by inhalation every four (4) hours as needed for Cough. Prescriptions Printed Refills OTHER 0 Sig: Codeine/GG 10-100mg/5ml Guaifenesin-Codeine soln 1-2 tsp by mouth every 6 hours as needed for cough Class: Print Patient Instructions Continue with all medications as prescribed. Bronchitis: Care Instructions Your Care Instructions Bronchitis is inflammation of the bronchial tubes, which carry air to the lungs. The tubes swell and produce mucus, or phlegm. The mucus and inflamed bronchial tubes make you cough. You may have trouble breathing. Most cases of bronchitis are caused by viruses like those that cause colds. Antibiotics usually do not help and they may be harmful. Bronchitis usually develops rapidly and lasts about 2 to 3 weeks in otherwise healthy people. Follow-up care is a key part of your treatment and safety. Be sure to make and go to all appointments, and call your doctor if you are having problems. It's also a good idea to know your test results and keep a list of the medicines you take. How can you care for yourself at home? · Take all medicines exactly as prescribed. Call your doctor if you think you are having a problem with your medicine.  
· Get some extra rest. 
· Take an over-the-counter pain medicine, such as acetaminophen (Tylenol), ibuprofen (Advil, Motrin), or naproxen (Aleve) to reduce fever and relieve body aches. Read and follow all instructions on the label. · Do not take two or more pain medicines at the same time unless the doctor told you to. Many pain medicines have acetaminophen, which is Tylenol. Too much acetaminophen (Tylenol) can be harmful. · Take an over-the-counter cough medicine that contains dextromethorphan to help quiet a dry, hacking cough so that you can sleep. Avoid cough medicines that have more than one active ingredient. Read and follow all instructions on the label. · Breathe moist air from a humidifier, hot shower, or sink filled with hot water. The heat and moisture will thin mucus so you can cough it out. · Do not smoke. Smoking can make bronchitis worse. If you need help quitting, talk to your doctor about stop-smoking programs and medicines. These can increase your chances of quitting for good. When should you call for help? Call 911 anytime you think you may need emergency care. For example, call if: 
· You have severe trouble breathing. Call your doctor now or seek immediate medical care if: 
· You have new or worse trouble breathing. · You cough up dark brown or bloody mucus (sputum). · You have a new or higher fever. · You have a new rash. Watch closely for changes in your health, and be sure to contact your doctor if: 
· You cough more deeply or more often, especially if you notice more mucus or a change in the color of your mucus. · You are not getting better as expected. Where can you learn more? Go to http://mirian-roz.info/. Enter H333 in the search box to learn more about \"Bronchitis: Care Instructions. \" Current as of: May 23, 2016 Content Version: 11.1 © 8393-7641 [employer].  Care instructions adapted under license by [employer] (which disclaims liability or warranty for this information). If you have questions about a medical condition or this instruction, always ask your healthcare professional. [address] any warranty or liability for your use of this information. Introducing [facility] SERVICES! Roselyn Gill introduces [employer] patient portal. Now you can access parts of your medical record, email your doctor's office, and request medication refills online. 1. In your internet browser, go to [email or web address] 2. Click on the First Time User? Click Here link in the Sign In box. You will see the New Member Sign Up page. 3. Enter your [employer] Access Code exactly as it appears below. You will not need to use this code after youve completed the sign-up process. If you do not sign up before the expiration date, you must request a new code. · [employer] Access Code: [email or web address] Expires: 4/16/2017 12:24 PM 
 
4. Enter the last four digits of your Social Security Number (xxxx) and Date of Birth (mm/dd/yyyy) as indicated and click Submit. You will be taken to the next sign-up page. 5. Create a [employer] ID. This will be your [employer] login ID and cannot be changed, so think of one that is secure and easy to remember. 6. Create a [employer] password. You can change your password at any time. 7. Enter your Password Reset Question and Answer. This can be used at a later time if you forget your password. 8. Enter your e-mail address. You will receive e-mail notification when new information is available in [address] 9. Click Sign Up. You can now view and download portions of your medical record. 10. Click the Download Summary menu link to download a portable copy of your medical information. If you have questions, please visit the Frequently Asked Questions section of the [employer] website. Remember, [employer] is NOT to be used for urgent needs. For medical emergencies, dial 911. Now available from your iPhone and Android! Please provide this summary of care documentation to your next provider. Your primary care clinician is listed as Krzysztof Garcia. If you have any questions after today's visit, please call [phone].

## 2017-03-13 NOTE — PATIENT INSTRUCTIONS
Continue with all medications as prescribed. Bronchitis: Care Instructions  Your Care Instructions    Bronchitis is inflammation of the bronchial tubes, which carry air to the lungs. The tubes swell and produce mucus, or phlegm. The mucus and inflamed bronchial tubes make you cough. You may have trouble breathing. Most cases of bronchitis are caused by viruses like those that cause colds. Antibiotics usually do not help and they may be harmful. Bronchitis usually develops rapidly and lasts about 2 to 3 weeks in otherwise healthy people. Follow-up care is a key part of your treatment and safety. Be sure to make and go to all appointments, and call your doctor if you are having problems. It's also a good idea to know your test results and keep a list of the medicines you take. How can you care for yourself at home? · Take all medicines exactly as prescribed. Call your doctor if you think you are having a problem with your medicine. · Get some extra rest.  · Take an over-the-counter pain medicine, such as acetaminophen (Tylenol), ibuprofen (Advil, Motrin), or naproxen (Aleve) to reduce fever and relieve body aches. Read and follow all instructions on the label. · Do not take two or more pain medicines at the same time unless the doctor told you to. Many pain medicines have acetaminophen, which is Tylenol. Too much acetaminophen (Tylenol) can be harmful. · Take an over-the-counter cough medicine that contains dextromethorphan to help quiet a dry, hacking cough so that you can sleep. Avoid cough medicines that have more than one active ingredient. Read and follow all instructions on the label. · Breathe moist air from a humidifier, hot shower, or sink filled with hot water. The heat and moisture will thin mucus so you can cough it out. · Do not smoke. Smoking can make bronchitis worse. If you need help quitting, talk to your doctor about stop-smoking programs and medicines.  These can increase your chances of quitting for good. When should you call for help? Call 911 anytime you think you may need emergency care. For example, call if:  · You have severe trouble breathing. Call your doctor now or seek immediate medical care if:  · You have new or worse trouble breathing. · You cough up dark brown or bloody mucus (sputum). · You have a new or higher fever. · You have a new rash. Watch closely for changes in your health, and be sure to contact your doctor if:  · You cough more deeply or more often, especially if you notice more mucus or a change in the color of your mucus. · You are not getting better as expected. Where can you learn more? Go to http://mirian-roz.info/. Enter H333 in the search box to learn more about \"Bronchitis: Care Instructions. \"  Current as of: May 23, 2016  Content Version: 11.1  © 1362-1256 [employer]. Care instructions adapted under license by [employer] (which disclaims liability or warranty for this information). If you have questions about a medical condition or this instruction, always ask your healthcare professional. [place] [address] any warranty or liability for your use of this information.

## 2017-03-13 NOTE — PROGRESS NOTES
Patient called in to [facility] office to try to get an earlier appointment to see Dr Mallika Guevara around 8 am. [facility] returned patient's call he is having increasing cough, not sleeping drainage down the back of his throat during the night. He has taken all of the cough syrup that was given to him in urgent care. No fever. Appointment made with GONZALEZ Mendez for 3:15 today. Dr. Mallika Guevara has no openings today. Since patient had that run of Afib during his hospital stay thought it best to be seen .

## 2017-03-13 NOTE — Clinical Note
Can you please review my 3/9/2017 Encounter note or his sentara notes. Patient is scheduled for a NAA with Kam on 3/15/2017 for hospital follow up same issue respiratory. TI spoke with Dr Tu Arteaga and he is in agreement that he needs to be seen because of his afib induce by coughing last week.  Thanks

## 2017-03-13 NOTE — PROGRESS NOTES
HISTORY OF PRESENT ILLNESS  Leopoldo Abts is a 66 y.o. male. HPI Patient and his wife present for recheck after discharge from the hospital for treatment of bronchitis. He is using a nebulizer and has an Advair inhaler (250/50), prednisone taper and Doxycycline. His coughing is quite bothersome, dry and keeps him awake at night. Cough is worse in the supine position and his upper abdomen is \"sore\" related to the intense coughing. He is tolerating fluids and solids. No nausea or vomiting. Symptom onset gradual, timing constant, pain mild to moderate. Past Medical History:   Diagnosis Date    Allergic rhinitis due to allergen     Benign prostatic hypertrophy (BPH) with nocturia     BPH (benign prostatic hyperplasia)     Dermatitis     Hypercholesterolemia     Mixed hyperlipidemia     Primary insomnia     Weak urinary stream        Current Outpatient Prescriptions:     OTHER, Codeine/GG 10-100mg/5ml Guaifenesin-Codeine soln  1-2 tsp by mouth every 6 hours as needed for cough, Disp: 120 mL, Rfl: 0    albuterol (VENTOLIN HFA) 90 mcg/actuation inhaler, Take 2 Puffs by inhalation every four (4) hours as needed for Cough. , Disp: , Rfl:     guaiFENesin-codeine (ROBITUSSIN AC) 100-10 mg/5 mL solution, Take 5-10 mL by mouth every six (6) hours as needed for Cough. , Disp: , Rfl:     fluticasone-salmeterol (ADVAIR) 250-50 mcg/dose diskus inhaler, Take 1 Puff by inhalation two (2) times a day., Disp: , Rfl:     furosemide (LASIX) 20 mg tablet, Take 20 mg by mouth daily. , Disp: , Rfl:     predniSONE (DELTASONE) 20 mg tablet, Take 20 mg by mouth as directed., Disp: , Rfl:     menthol-camphor-benzyl alcohol 0.25-0.5-10 % spra, Apply 1 Applicator to affected area as needed for pain/local anesthesia., Disp: , Rfl:     acetaminophen (TYLENOL EXTRA STRENGTH) 500 mg tablet, Take  by mouth every six (6) hours as needed for Pain., Disp: , Rfl:     doxepin (SINEQUAN) 25 mg capsule, Take 1 Cap by mouth nightly., Disp: 90 Cap, Rfl: 0    simvastatin (ZOCOR) 20 mg tablet, Take 1 Tab by mouth nightly., Disp: 90 Tab, Rfl: 1    diclofenac EC (VOLTAREN) 75 mg EC tablet, Take 1 Tab by mouth two (2) times a day., Disp: 60 Tab, Rfl: 0    MULTIVITAMIN/IRON/FOLIC ACID (CENTRUM COMPLETE PO), Take  by mouth., Disp: , Rfl:     tamsulosin (FLOMAX) 0.4 mg capsule, Take 0.4 mg by mouth daily. , Disp: , Rfl:     benzonatate (TESSALON) 200 mg capsule, Take 200 mg by mouth three (3) times daily. , Disp: , Rfl:     doxycycline (MONODOX) 100 mg capsule, Take 100 mg by mouth every twelve (12) hours. For 5 days, Disp: , Rfl:     nicotine (NICODERM CQ) 21 mg/24 hr, 1 Patch by TransDERmal route every twenty-four (24) hours. Patient is refusing to use at this time, Disp: , Rfl:     Review of Systems   Constitutional: Negative. Negative for chills, diaphoresis, fever, malaise/fatigue and weight loss. HENT: Positive for congestion. Negative for ear pain and sore throat. Eyes: Negative. Negative for blurred vision and pain. Respiratory: Negative for hemoptysis, sputum production, shortness of breath and wheezing. Cardiovascular: Negative for chest pain, palpitations, orthopnea and claudication. Gastrointestinal: Negative. Negative for abdominal pain, heartburn, nausea and vomiting. Musculoskeletal: Negative. Negative for back pain, myalgias and neck pain. Skin: Negative. Negative for itching and rash. Neurological: Negative. Negative for dizziness, focal weakness and headaches. Physical Exam   Constitutional: He is oriented to person, place, and time. He appears well-developed and well-nourished. No distress. Nontoxic appearance. Speaking in 6-8 word sentences. HENT:   Head: Normocephalic and atraumatic. Right Ear: External ear normal.   Left Ear: External ear normal.   Mouth/Throat: Oropharynx is clear and moist. No oropharyngeal exudate. Eyes: EOM are normal. Pupils are equal, round, and reactive to light.  Right eye exhibits no discharge. Left eye exhibits no discharge. No scleral icterus. Sclera slightly red, conjunctiva mildly injected. Neck: Neck supple. No tracheal deviation present. Cardiovascular: Normal rate, regular rhythm, normal heart sounds and intact distal pulses. Exam reveals no gallop and no friction rub. No murmur heard. Pulmonary/Chest: Effort normal and breath sounds normal. No stridor. No respiratory distress. He has no wheezes. He has no rales. He exhibits no tenderness. Abdominal: Soft. Bowel sounds are normal. He exhibits no distension. There is no tenderness. There is no rebound and no guarding. Musculoskeletal: He exhibits no edema, tenderness or deformity. Neurological: He is alert and oriented to person, place, and time. Coordination normal.   Skin: Skin is warm and dry. No rash noted. He is not diaphoretic. No erythema. No pallor. Psychiatric: His behavior is normal. Judgment and thought content normal.   Nursing note and vitals reviewed. ASSESSMENT and PLAN  1. Acute bronchitis with slow improvement;   -Continue with Doxycycline and prednisone as prescribed. -Continue nebulizing as needed. -Refilled codeine cough syrup.   -Follow up with PCP in 48 hours as scheduled. Patient and wife are in agreement with the treatment plan. Return to the clinic if needed. All questions answered and discharge instructions reviewed with the patient.

## 2017-03-15 ENCOUNTER — OFFICE VISIT (OUTPATIENT)
Dept: FAMILY MEDICINE CLINIC | Age: 72
End: 2017-03-15

## 2017-03-15 VITALS
HEIGHT: 70 IN | TEMPERATURE: 97.7 F | SYSTOLIC BLOOD PRESSURE: 103 MMHG | WEIGHT: 258 LBS | OXYGEN SATURATION: 94 % | DIASTOLIC BLOOD PRESSURE: 77 MMHG | HEART RATE: 63 BPM | BODY MASS INDEX: 36.94 KG/M2 | RESPIRATION RATE: 18 BRPM

## 2017-03-15 DIAGNOSIS — J20.9 ACUTE BRONCHITIS, UNSPECIFIED ORGANISM: ICD-10-CM

## 2017-03-15 DIAGNOSIS — L98.9 SKIN LESION: Primary | ICD-10-CM

## 2017-03-15 NOTE — PROGRESS NOTES
1. Have you been to the ER, urgent care clinic since your last visit? Hospitalized since your last visit? Yes When: 3/8/17 [facility]    2. Have you seen or consulted any other health care providers outside of the [address] [place] [address] since your last visit? Include any pap smears or colon screening.  No

## 2017-03-15 NOTE — PROGRESS NOTES
HISTORY OF PRESENT ILLNESS  Maureen Ferrari is a 66 y.o. male. HPI  C/o skin lesion on the back for few months, getting bigger and irritated at times    Acute bronchitis, improving, he feels better since was seen 2 days ago, using his robitussin ac cough syrup prn, he did complete doxycycline course since he was discharged from the hospital, his discharge summary noted today    New onset A fib, he will see cardiology next week, his rate is controlled, he is on lasix 20 mg daily  Review of Systems   Constitutional: Negative for chills and fever. Respiratory: Negative for shortness of breath and wheezing. Cardiovascular: Negative for chest pain, palpitations and leg swelling. Physical Exam   Constitutional: He appears well-developed and well-nourished. Cardiovascular: Normal rate and normal heart sounds. An irregularly irregular rhythm present. No murmur heard. Pulmonary/Chest: Effort normal and breath sounds normal. He has no wheezes. He has no rales. Lymphadenopathy:     He has no cervical adenopathy. Skin:   Left upper back has raised/erythematous skin lesion with irregular borders   Vitals reviewed. NAHOMI and Yusuf Ellison was seen today for transitions of care.     Diagnoses and all orders for this visit:    Skin lesion, need biopsy  -     REFERRAL TO DERMATOLOGY    Acute bronchitis, unspecified organism, improving, continue robitussin prn

## 2017-03-28 NOTE — PROGRESS NOTES
Call place to patient today 3/28/17. Patient wasn't home but I spoke with his wife Mrs. [facility]. She states he is doing well. He has seen Michelle Jones, cardiology and dermatology since last out reach by [facility]. They have no new complaints. She states Danette De La Rosa is well on the road to recovery\". She has been advised to contact our office if assistance is needed.

## 2017-05-18 ENCOUNTER — OFFICE VISIT (OUTPATIENT)
Dept: FAMILY MEDICINE CLINIC | Age: 72
End: 2017-05-18

## 2017-05-18 VITALS
HEART RATE: 78 BPM | OXYGEN SATURATION: 96 % | TEMPERATURE: 99.1 F | RESPIRATION RATE: 14 BRPM | BODY MASS INDEX: 38.45 KG/M2 | DIASTOLIC BLOOD PRESSURE: 70 MMHG | WEIGHT: 268.6 LBS | HEIGHT: 70 IN | SYSTOLIC BLOOD PRESSURE: 118 MMHG

## 2017-05-18 DIAGNOSIS — R05.3 CHRONIC COUGH: Primary | ICD-10-CM

## 2017-05-18 DIAGNOSIS — I48.20 CHRONIC ATRIAL FIBRILLATION (HCC): ICD-10-CM

## 2017-05-18 RX ORDER — FLUTICASONE PROPIONATE 110 UG/1
AEROSOL, METERED RESPIRATORY (INHALATION)
Qty: 1 INHALER | Refills: 2 | Status: SHIPPED | OUTPATIENT
Start: 2017-05-18 | End: 2017-09-21

## 2017-05-18 RX ORDER — MONTELUKAST SODIUM 10 MG/1
10 TABLET ORAL DAILY
Qty: 30 TAB | Refills: 2 | Status: SHIPPED | OUTPATIENT
Start: 2017-05-18 | End: 2017-09-21

## 2017-05-18 NOTE — MR AVS SNAPSHOT
Visit Information Date & Time Provider Department Dept. Phone Encounter #  
 5/18/2017  9:45 AM Sushil Dhaliwal MD 3 [facility] [phone] [id number] Your Appointments 5/31/2017  9:30 AM  
Follow Up with Luly Conti MD  
3 [facility] Appt Note: 4 month f/u appt [address] [facility] [address] [phone]  
  
   
 [address] [address] [address] Upcoming Health Maintenance Date Due ZOSTER VACCINE AGE 60> 11/27/2004 GLAUCOMA SCREENING Q2Y 11/27/2009 INFLUENZA AGE 9 TO ADULT 8/1/2017 COLONOSCOPY 1/7/2018 MEDICARE YEARLY EXAM 2/1/2018 DTaP/Tdap/Td series (2 - Td) 1/5/2019 Allergies as of 5/18/2017  Review Complete On: 5/18/2017 By: Sushil Dhaliwal MD  
 No Known Allergies Current Immunizations  Never Reviewed Name Date Influenza Vaccine 11/1/2016 12:00 AM  
 Pneumococcal Conjugate (PCV-13) 1/31/2017 Pneumococcal Polysaccharide (PPSV-23) 1/1/2013 12:00 AM  
  
 Not reviewed this visit You Were Diagnosed With   
  
 Codes Comments Chronic cough    -  Primary ICD-10-CM: D35 ICD-9-CM: 513. 2 Chronic atrial fibrillation (HCC)     ICD-10-CM: D14.3 ICD-9-CM: 427.31 Vitals BP Pulse Temp Resp Height(growth percentile) Weight(growth percentile) 118/70 (BP 1 Location: Left arm, BP Patient Position: Sitting) 78 99.1 °F (37.3 °C) (Oral) 14 5' 10\" (1.778 m) 268 lb 9.6 oz (121.8 kg) SpO2 BMI Smoking Status 96% 38.54 kg/m2 Former Smoker BMI and BSA Data Body Mass Index Body Surface Area 38.54 kg/m 2 2.45 m 2 Preferred Pharmacy Pharmacy Name Phone [address] [facility],  Raj Vidal [address] Your Updated Medication List  
  
   
This list is accurate as of: 5/18/17 10:04 AM.  Always use your most recent med list.  
  
  
  
  
 benzonatate 200 mg capsule Commonly known as:  TESSALON Take 200 mg by mouth three (3) times daily. CENTRUM COMPLETE PO Take  by mouth. doxepin 25 mg capsule Commonly known as:  SINEquan Take 1 Cap by mouth nightly. finasteride 5 mg tablet Commonly known as:  PROSCAR Take 1 Tab by mouth daily. fluticasone 110 mcg/actuation inhaler Commonly known as:  FLOVENT HFA  
2 puffs twice daily until the cough resolves, rinse mouth and throat after use. furosemide 20 mg tablet Commonly known as:  LASIX Take 20 mg by mouth daily. montelukast 10 mg tablet Commonly known as:  SINGULAIR Take 1 Tab by mouth daily. nicotine 21 mg/24 hr  
Commonly known as:  NICODERM CQ  
1 Patch by TransDERmal route every twenty-four (24) hours. Patient is refusing to use at this time  
  
 simvastatin 20 mg tablet Commonly known as:  ZOCOR Take 1 Tab by mouth nightly. * tamsulosin 0.4 mg capsule Commonly known as:  FLOMAX Take 0.4 mg by mouth daily. * tamsulosin 0.4 mg capsule Commonly known as:  FLOMAX Take 2 Caps by mouth daily (after dinner). TYLENOL EXTRA STRENGTH 500 mg tablet Generic drug:  acetaminophen Take  by mouth every six (6) hours as needed for Pain. VENTOLIN HFA 90 mcg/actuation inhaler Generic drug:  albuterol Take 2 Puffs by inhalation every four (4) hours as needed for Cough. * Notice: This list has 2 medication(s) that are the same as other medications prescribed for you. Read the directions carefully, and ask your doctor or other care provider to review them with you. Prescriptions Sent to Pharmacy Refills  
 fluticasone (FLOVENT HFA) 110 mcg/actuation inhaler 2 Si puffs twice daily until the cough resolves, rinse mouth and throat after use. Class: Normal  
 Pharmacy: [address] [place] [address], [facility] [address] Ph #: [phone] montelukast (SINGULAIR) 10 mg tablet 2 Sig: Take 1 Tab by mouth daily. Class: Normal  
 Pharmacy: [address], [facility] [address] Ph #: [phone] Route: Oral  
  
Patient Instructions Fluticasone inhaler 2 puffs twice daily until the cough resolves, rinse mouth and throat after use. Albuterol in haler 2 puffs every 4 hours only if needed Montelukast 10 mg daily Return for follow up with Dr. Catracho Love in 2-3 weeks, sooner with any problems. Introducing [facility]! [facility] introduces Av[employer] patient portal. Now you can access parts of your medical record, email your doctor's office, and request medication refills online. 1. In your internet browser, go to [email or web address] 2. Click on the First Time User? Click Here link in the Sign In box. You will see the New Member Sign Up page. 3. Enter your Avito.ru Access Code exactly as it appears below. You will not need to use this code after youve completed the sign-up process. If you do not sign up before the expiration date, you must request a new code. · [employer] Access Code: [email or web address] Expires: 7/25/2017  4:01 PM 
 
4. Enter the last four digits of your Social Security Number (xxxx) and Date of Birth (mm/dd/yyyy) as indicated and click Submit. You will be taken to the next sign-up page. 5. Create a [employer] ID. This will be your Avito.ru login ID and cannot be changed, so think of one that is secure and easy to remember. 6. Create a Av[employer]ru password. You can change your password at any time. 7. Enter your Password Reset Question and Answer. This can be used at a later time if you forget your password. 8. Enter your e-mail address. You will receive e-mail notification when new information is available in [address] 9. Click Sign Up. You can now view and download portions of your medical record.  
10. Click the Download Summary menu link to download a portable copy of your medical information. If you have questions, please visit the Frequently Asked Questions section of the Bluenote website. Remember, Bluenote is NOT to be used for urgent needs. For medical emergencies, dial 911. Now available from your iPhone and Android! Please provide this summary of care documentation to your next provider. Your primary care clinician is listed as Indiana Gottlieb. If you have any questions after today's visit, please call [phone].

## 2017-05-18 NOTE — PROGRESS NOTES
Debora Honeycutt is a 66 y.o. male here for cough       1. Have you been to the ER, urgent care clinic or hospitalized since your last visit? YES Urgent     2. Have you seen or consulted any other health care providers outside of the [address] [place] [address] since your last visit (Include any pap smears or colon screening)? NO      Do you have an Advanced Directive? NO    Would you like information on Advanced Directives?  NO

## 2017-05-18 NOTE — PATIENT INSTRUCTIONS
Fluticasone inhaler 2 puffs twice daily until the cough resolves, rinse mouth and throat after use. Albuterol in haler 2 puffs every 4 hours only if needed  Montelukast 10 mg daily  Return for follow up with Dr. Nikolai Cruz in 2-3 weeks, sooner with any problems.

## 2017-05-18 NOTE — PROGRESS NOTES
HISTORY OF PRESENT ILLNESS  Gen Zheng is a 66 y.o. male. HPI Comments: Mr. Remberto Rich reports that he was hospitalized in 3/2017 with bronchitis. He has been experiencing cough and wheezing which waxes and wanes since. He states that he has seen a pulmonologist and was advised that PFTs were normal. He used Advair transiently after hospital discharge but found it too expensive to continue. He was seen at an urgent care clinic a week ago and has just finished a course of azithromycin. He has also been diagnosed with atrial fibrillation and takes daily aspirin recommended by his cardiology consultant. Cough   The history is provided by the patient and medical records. This is a recurrent problem. Episode onset: 3 months ago. Associated symptoms include shortness of breath (only with cough). Pertinent negatives include no chest pain and no abdominal pain. Patient Active Problem List   Diagnosis Code    Benign non-nodular prostatic hyperplasia without lower urinary tract symptoms N40.0    Mixed hyperlipidemia E78.2    Primary osteoarthritis of both knees M17.0    Acute bronchitis J20.9    Atrial fibrillation (HCC) I48.91     Medications as of the conclusion of today's encounter:    Current Outpatient Prescriptions:     fluticasone (FLOVENT HFA) 110 mcg/actuation inhaler, 2 puffs twice daily until the cough resolves, rinse mouth and throat after use., Disp: 1 Inhaler, Rfl: 2    montelukast (SINGULAIR) 10 mg tablet, Take 1 Tab by mouth daily. , Disp: 30 Tab, Rfl: 2    tamsulosin (FLOMAX) 0.4 mg capsule, Take 2 Caps by mouth daily (after dinner). , Disp: 180 Cap, Rfl: 3    albuterol (VENTOLIN HFA) 90 mcg/actuation inhaler, Take 2 Puffs by inhalation every four (4) hours as needed for Cough. , Disp: , Rfl:     benzonatate (TESSALON) 200 mg capsule, Take 200 mg by mouth three (3) times daily. , Disp: , Rfl:     furosemide (LASIX) 20 mg tablet, Take 20 mg by mouth daily. , Disp: , Rfl:     nicotine (Joslyn Justice) 21 mg/24 hr, 1 Patch by TransDERmal route every twenty-four (24) hours. Patient is refusing to use at this time, Disp: , Rfl:     acetaminophen (TYLENOL EXTRA STRENGTH) 500 mg tablet, Take  by mouth every six (6) hours as needed for Pain., Disp: , Rfl:     doxepin (SINEQUAN) 25 mg capsule, Take 1 Cap by mouth nightly., Disp: 90 Cap, Rfl: 0    simvastatin (ZOCOR) 20 mg tablet, Take 1 Tab by mouth nightly., Disp: 90 Tab, Rfl: 1    MULTIVITAMIN/IRON/FOLIC ACID (CENTRUM COMPLETE PO), Take  by mouth., Disp: , Rfl:     tamsulosin (FLOMAX) 0.4 mg capsule, Take 0.4 mg by mouth daily. , Disp: , Rfl:     Review of Systems   Constitutional: Negative for chills and fever. HENT:        Rhinorrhea, sneezing   Respiratory: Positive for cough, shortness of breath (only with cough) and wheezing. Negative for sputum production. Cardiovascular: Positive for leg swelling (chronic-venous insufficiency). Negative for chest pain and palpitations. Gastrointestinal: Negative for abdominal pain, diarrhea, nausea and vomiting. Endo/Heme/Allergies: Positive for environmental allergies. Visit Vitals    /70 (BP 1 Location: Left arm, BP Patient Position: Sitting)    Pulse 78    Temp 99.1 °F (37.3 °C) (Oral)    Resp 14    Ht 5' 10\" (1.778 m)    Wt 268 lb 9.6 oz (121.8 kg)    SpO2 96%    BMI 38.54 kg/m2     Physical Exam   Constitutional: He is oriented to person, place, and time. He appears well-developed and well-nourished. HENT:   Head: Normocephalic. Right Ear: Tympanic membrane and ear canal normal.   Left Ear: Tympanic membrane and ear canal normal.   Mouth/Throat: Oropharynx is clear and moist.   Eyes: Conjunctivae and EOM are normal.   Neck: Neck supple. Cardiovascular: Normal rate and normal heart sounds. Irregularly irregular   Pulmonary/Chest: Effort normal and breath sounds normal.   Lymphadenopathy:     He has no cervical adenopathy.    Neurological: He is alert and oriented to person, place, and time.   Skin: Skin is warm and dry. Psychiatric: He has a normal mood and affect. His behavior is normal.   Nursing note and vitals reviewed. ASSESSMENT and PLAN    ICD-10-CM ICD-9-CM    1. Chronic cough R05 786.2 fluticasone (FLOVENT HFA) 110 mcg/actuation inhaler      montelukast (SINGULAIR) 10 mg tablet   2. Chronic atrial fibrillation (HCC) I48.2 427.31    Fluticasone inhaler 2 puffs twice daily until the cough resolves, rinse mouth and throat after use. Albuterol inhaler 2 puffs every 4 hours only if needed  Montelukast 10 mg daily  Return for follow up with Dr. Mark Anthony Hernandez in 2-3 weeks, sooner with any problems.

## 2017-06-02 ENCOUNTER — OFFICE VISIT (OUTPATIENT)
Dept: FAMILY MEDICINE CLINIC | Age: 72
End: 2017-06-02

## 2017-06-02 VITALS
SYSTOLIC BLOOD PRESSURE: 114 MMHG | WEIGHT: 272 LBS | HEART RATE: 82 BPM | OXYGEN SATURATION: 94 % | RESPIRATION RATE: 20 BRPM | TEMPERATURE: 96.3 F | BODY MASS INDEX: 38.94 KG/M2 | DIASTOLIC BLOOD PRESSURE: 71 MMHG | HEIGHT: 70 IN

## 2017-06-02 DIAGNOSIS — E78.2 MIXED HYPERLIPIDEMIA: Primary | ICD-10-CM

## 2017-06-02 DIAGNOSIS — R05.9 COUGH: ICD-10-CM

## 2017-06-02 NOTE — PROGRESS NOTES
HISTORY OF PRESENT ILLNESS  Luisito Plummer is a 66 y.o. male. HPI  HLD, stable, on zocor daily, last LDL was 72    Cough, s/p bronchitis, improved, he used singulair and Flovent for 2 weeks, feels much better  A fib , stable, followed by Cardiology, on ASA only, SWAPNA score is low at 1    Review of Systems   Constitutional: Negative for chills and fever. Respiratory: Negative for cough and shortness of breath. Cardiovascular: Negative for chest pain. Physical Exam   Constitutional: He appears well-developed and well-nourished. Neck: Neck supple. Cardiovascular: Normal rate and normal heart sounds. An irregularly irregular rhythm present. Pulmonary/Chest: Effort normal and breath sounds normal. He has no wheezes. He has no rales. Abdominal: Soft. There is no tenderness. Vitals reviewed. ASSESSMENT and PLAN  Esther Mckinney was seen today for cough. Diagnoses and all orders for this visit:    Mixed hyperlipidemia, stable  -     LIPID PANEL; Future  -     METABOLIC PANEL, COMPREHENSIVE;  Future    Cough, improved, may use singulair prn    Lab Results   Component Value Date/Time    Cholesterol, total 148 01/23/2017 10:35 AM    HDL Cholesterol 59 01/23/2017 10:35 AM    LDL, calculated 72 01/23/2017 10:35 AM    VLDL, calculated 17 01/23/2017 10:35 AM    Triglyceride 85 01/23/2017 10:35 AM    CHOL/HDL Ratio 2.5 01/23/2017 10:35 AM

## 2017-06-02 NOTE — PROGRESS NOTES
Greg Montelongo is a 66 y.o. male  Chief Complaint   Patient presents with    Cough     follow up     1. Have you been to the ER, urgent care clinic since your last visit? Hospitalized since your last visit? Yes When: May 2017 Where: [facility] Reason for visit: Bronchitis    2. Have you seen or consulted any other health care providers outside of the [address] [place] [address] since your last visit? Include any pap smears or colon screening.  No

## 2017-07-07 ENCOUNTER — TELEPHONE (OUTPATIENT)
Dept: FAMILY MEDICINE CLINIC | Age: 72
End: 2017-07-07

## 2017-07-07 NOTE — TELEPHONE ENCOUNTER
Pt called to request a refill of his Simvastatin 20 mgs for a 90 day supply with 3 refills. Med not on meds list so I was unable to select or pend medication.

## 2017-07-09 RX ORDER — SIMVASTATIN 20 MG/1
20 TABLET, FILM COATED ORAL
Qty: 90 TAB | Refills: 3 | Status: SHIPPED | OUTPATIENT
Start: 2017-07-09 | End: 2017-08-07 | Stop reason: SDUPTHER

## 2017-08-07 ENCOUNTER — OFFICE VISIT (OUTPATIENT)
Dept: FAMILY MEDICINE CLINIC | Age: 72
End: 2017-08-07

## 2017-08-07 VITALS
BODY MASS INDEX: 35.24 KG/M2 | WEIGHT: 246.2 LBS | RESPIRATION RATE: 20 BRPM | OXYGEN SATURATION: 96 % | DIASTOLIC BLOOD PRESSURE: 61 MMHG | HEART RATE: 60 BPM | SYSTOLIC BLOOD PRESSURE: 109 MMHG | HEIGHT: 70 IN | TEMPERATURE: 98.4 F

## 2017-08-07 DIAGNOSIS — R21 RASH: Primary | ICD-10-CM

## 2017-08-07 DIAGNOSIS — L03.115 CELLULITIS OF RIGHT LOWER EXTREMITY: ICD-10-CM

## 2017-08-07 RX ORDER — CEPHALEXIN 500 MG/1
500 CAPSULE ORAL 4 TIMES DAILY
COMMUNITY
End: 2017-09-21

## 2017-08-07 RX ORDER — CLOTRIMAZOLE AND BETAMETHASONE DIPROPIONATE 10; .64 MG/G; MG/G
CREAM TOPICAL 2 TIMES DAILY
Qty: 15 G | Refills: 1 | Status: SHIPPED | OUTPATIENT
Start: 2017-08-07 | End: 2017-09-21

## 2017-08-07 NOTE — PROGRESS NOTES
HISTORY OF PRESENT ILLNESS  Donna Sierra is a 66 y.o. male. HPI  C/o right shin cellulitis, went to the ER 2 days ago, given Keflex, much better, less redness, no pain    C/o rash on the left knee, red/scaly, started 2 months ago, slightly itchy  Review of Systems   Constitutional: Negative for chills and fever. Cardiovascular: Negative for chest pain and palpitations. Physical Exam   Constitutional: He appears well-developed and well-nourished. Skin: Rash (anterior left knee has dry erythematouis patch with sharp/raised borders, ) noted. There is erythema (mild erythema at the right shin, no tenderness, no warmth, no discharge). Vitals reviewed. ASSESSMENT and PLAN  Diagnoses and all orders for this visit:    1. Rash, ? tinea  -     clotrimazole-betamethasone (LOTRISONE) topical cream; Apply  to affected area two (2) times a day. For 2 weeks    2.  Cellulitis of right lower extremity, improving, continue keflex for 7 days

## 2017-08-07 NOTE — PROGRESS NOTES
Suzanne Dejesus is a 66 y.o. male  1. Have you been to the ER, urgent care clinic since your last visit? Hospitalized since your last visit? Yes When: 08/05/17 Where: [address] [place] [address] Reason for visit: Cellulitis    2. Have you seen or consulted any other health care providers outside of the [address] [place] [address] since your last visit? Include any pap smears or colon screening.  No

## 2018-01-15 DIAGNOSIS — E78.2 MIXED HYPERLIPIDEMIA: ICD-10-CM

## 2018-01-15 RX ORDER — SIMVASTATIN 20 MG/1
20 TABLET, FILM COATED ORAL
Qty: 90 TAB | Refills: 1 | Status: SHIPPED | OUTPATIENT
Start: 2018-01-15 | End: 2018-07-12 | Stop reason: SDUPTHER

## 2018-02-12 ENCOUNTER — OFFICE VISIT (OUTPATIENT)
Dept: FAMILY MEDICINE CLINIC | Age: 73
End: 2018-02-12

## 2018-02-12 VITALS
DIASTOLIC BLOOD PRESSURE: 70 MMHG | TEMPERATURE: 97.8 F | OXYGEN SATURATION: 95 % | HEIGHT: 70 IN | BODY MASS INDEX: 36.13 KG/M2 | SYSTOLIC BLOOD PRESSURE: 100 MMHG | HEART RATE: 51 BPM | WEIGHT: 252.4 LBS | RESPIRATION RATE: 20 BRPM

## 2018-02-12 DIAGNOSIS — Z12.11 COLON CANCER SCREENING: ICD-10-CM

## 2018-02-12 DIAGNOSIS — I48.20 CHRONIC ATRIAL FIBRILLATION (HCC): ICD-10-CM

## 2018-02-12 DIAGNOSIS — E78.2 MIXED HYPERLIPIDEMIA: ICD-10-CM

## 2018-02-12 DIAGNOSIS — Z00.00 MEDICARE ANNUAL WELLNESS VISIT, SUBSEQUENT: Primary | ICD-10-CM

## 2018-02-12 DIAGNOSIS — Z23 ENCOUNTER FOR IMMUNIZATION: ICD-10-CM

## 2018-02-12 NOTE — PATIENT INSTRUCTIONS

## 2018-02-12 NOTE — MR AVS SNAPSHOT
[address] [address] [facility] [address] [facility] [id number] [phone] Patient: Megan Chapa MRN: [id number] :1945 Visit Information Date & Time Provider Department Dept. Phone Encounter #  
 2018  9:00 AM Cece PedrozaOlinda [place] [phone] [id number] Follow-up Instructions Return in about 6 months (around 2018). Your Appointments 2018  9:15 AM  
ESTABLISHED PATIENT with Bushra Ferrara MD  
[facility]. [facility] [address] [facility]) Appt Note: Return in about 4 months (around 2018) for FLOW;.  
 [address] [address]  
[phone]  
  
   
 [facility] [address] Upcoming Health Maintenance Date Due COLONOSCOPY 2018 MEDICARE YEARLY EXAM 2018 DTaP/Tdap/Td series (2 - Td) 2019 GLAUCOMA SCREENING Q2Y 2019 Allergies as of 2018  Review Complete On: 2018 By: Cece Pedroza MD  
 No Known Allergies Current Immunizations  Never Reviewed Name Date Influenza High Dose Vaccine PF 2017 Influenza Vaccine 2016 12:00 AM  
 Pneumococcal Conjugate (PCV-13) 2017 Pneumococcal Polysaccharide (PPSV-23)  Incomplete, 2013 12:00 AM  
 Tdap 2018 Not reviewed this visit You Were Diagnosed With   
  
 Codes Comments Medicare annual wellness visit, subsequent    -  Primary ICD-10-CM: Z00.00 ICD-9-CM: V70.0 Colon cancer screening     ICD-10-CM: Z12.11 ICD-9-CM: V76.51 Encounter for immunization     ICD-10-CM: B43 ICD-9-CM: V03.89 Mixed hyperlipidemia     ICD-10-CM: E78.2 ICD-9-CM: 272.2 Chronic atrial fibrillation (HCC)     ICD-10-CM: K97.0 ICD-9-CM: 427.31 Vitals BP Pulse Temp Resp Height(growth percentile) Weight(growth percentile)  100/70 (!) 51 97.8 °F (36.6 °C) (Oral) 20 5' 10\" (1.778 m) 252 lb 6.4 oz (114.5 kg) SpO2 BMI Smoking Status 95% 36.22 kg/m2 Former Smoker Vitals History BMI and BSA Data Body Mass Index Body Surface Area  
 36.22 kg/m 2 2.38 m 2 Preferred Pharmacy Pharmacy Name Phone [facility], [address] [phone] Your Updated Medication List  
  
   
This list is accurate as of: 2/12/18  9:44 AM.  Always use your most recent med list.  
  
  
  
  
 ALEVICYN DERMAL 0.009 % Spry Generic drug:  hyp ac-sod chl-sod sul-sod shae  
by Apply Externally route. aspirin delayed-release 81 mg tablet Take 81 mg by mouth. Indications: Patient states that his cardiologist told him 3 weeks ago to stop taking CENTRUM COMPLETE PO Take  by mouth. simvastatin 20 mg tablet Commonly known as:  ZOCOR Take 1 Tab by mouth nightly. triamcinolone acetonide 0.025 % topical cream  
Commonly known as:  KENALOG Apply  to affected area two (2) times a day. use thin layer TYLENOL EXTRA STRENGTH 500 mg tablet Generic drug:  acetaminophen Take  by mouth every six (6) hours as needed for Pain. We Performed the Following ADMIN PNEUMOCOCCAL VACCINE [ HCP] PNEUMOCOCCAL POLYSACCHARIDE VACCINE, 23-VALENT, ADULT OR IMMUNOSUPPRESSED PT DOSE, [71758 CPT(R)] REFERRAL TO GASTROENTEROLOGY [EMG42 Custom] Follow-up Instructions Return in about 6 months (around 8/12/2018). To-Do List   
 02/12/2018 Lab:  CBC WITH AUTOMATED DIFF   
  
 02/12/2018 Lab:  LIPID PANEL   
  
 02/12/2018 Lab:  METABOLIC PANEL, COMPREHENSIVE Referral Information Referral ID Referred By Referred To  
  
 [id number] MD Jelly Segura [address] [facility], [address] [place] [address] Phone: [phone] Fax: [phone] Visits Status Start Date End Date 1 New Request 2/12/18 2/12/19 If your referral has a status of pending review or denied, additional information will be sent to support the outcome of this decision. Patient Instructions Medicare Wellness Visit, Male The best way to live healthy is to have a healthy lifestyle by eating a well-balanced diet, exercising regularly, limiting alcohol and stopping smoking. Regular physical exams and screening tests are another way to keep healthy. Preventive exams provided by your health care provider can find health problems before they become diseases or illnesses. Preventive services including immunizations, screening tests, monitoring and exams can help you take care of your own health. All people over age 72 should have a pneumovax  and and a prevnar shot to prevent pneumonia. These are once in a lifetime unless you and your provider decide differently. All people over 65 should have a yearly flu shot and a tetanus vaccine every 10 years. Screening for diabetes mellitus with a blood sugar test should be done every year. Glaucoma is a disease of the eye due to increased ocular pressure that can lead to blindness and it should be done every year by an eye professional. 
 
Cardiovascular screening tests that check for elevated lipids (fatty part of blood) which can lead to heart disease and strokes should be done every 5 years. Colorectal screening that evaluates for blood or polyps in your colon should be done yearly as a stool test or every five years as a flexible sigmoidoscope or every 10 years as a colonoscopy up to age 76. Men up to age 76 may need a screening blood test for prostate cancer at certain intervals, depending on their personal and family history. This decision is between the patient and his provider. If you have been a smoker or had family history of abdominal aortic aneurysms, you and your provider may decide to schedule an ultrasound test of your aorta. Hepatitis C screening is also recommended for anyone born between [birth year] through [address]. A shingles vaccine is also recommended once in a lifetime after age 61. Your Medicare Wellness Exam is recommended annually. Here is a list of your current Health Maintenance items with a due date: 
Health Maintenance Due Topic Date Due  
 Colonoscopy, ref to Colonoscopy 01/07/2018 [address] [place] [address] Annual Well Visit  02/2019 Introducing [facility]! [facility] introduces [employer] patient portal. Now you can access parts of your medical record, email your doctor's office, and request medication refills online. 1. In your internet browser, go to [email or web address] Digital 2. Click on the First Time User? Click Here link in the Sign In box. You will see the New Member Sign Up page. 3. Enter your [employer] Access Code exactly as it appears below. You will not need to use this code after youve completed the sign-up process. If you do not sign up before the expiration date, you must request a new code. · [employer] Access Code: [email or web address] Expires: 2/20/2018  8:56 AM 
 
4. Enter the last four digits of your Social Security Number (xxxx) and Date of Birth (mm/dd/yyyy) as indicated and click Submit. You will be taken to the next sign-up page. 5. Create a [employer] ID. This will be your [employer] login ID and cannot be changed, so think of one that is secure and easy to remember. 6. Create a [employer] password. You can change your password at any time. 7. Enter your Password Reset Question and Answer. This can be used at a later time if you forget your password. 8. Enter your e-mail address. You will receive e-mail notification when new information is available in [address] 9. Click Sign Up. You can now view and download portions of your medical record. 10. Click the Download Summary menu link to download a portable copy of your medical information. If you have questions, please visit the Frequently Asked Questions section of the [employer] website. Remember, [employer] is NOT to be used for urgent needs. For medical emergencies, dial 911. Now available from your iPhone and Android! Please provide this summary of care documentation to your next provider. Your primary care clinician is listed as Ramses Parra. If you have any questions after today's visit, please call [phone].

## 2018-02-12 NOTE — ACP (ADVANCE CARE PLANNING)
Discussed AMD today, form discussed and given, pt stated he will  complete later and bring copy to chart.

## 2018-02-12 NOTE — PROGRESS NOTES
This is a Subsequent Medicare Annual Wellness Exam (AWV) (Performed 12 months after IPPE or effective date of Medicare Part B enrollment)    I have reviewed the patient's medical history in detail and updated the computerized patient record. History     Past Medical History:   Diagnosis Date    Allergic rhinitis due to allergen     Atrial fibrillation ([facility] [address] 03/20/2017    Benign non-nodular prostatic hyperplasia without lower urinary tract symptoms     Bladder neck contracture     BPH (benign prostatic hyperplasia)     Dermatitis     Hypercholesterolemia     Ill-defined condition 02/2017    Bronchitis    Nocturia     OAB (overactive bladder)     Primary insomnia     Weak urinary stream       Past Surgical History:   Procedure Laterality Date    HC GREENLIGHT LASER C [facility] USE ONLY  6/7/2017         HX COLONOSCOPY      HX HEENT      wisdom teeth removal    HX HEENT      tonsillectomy    HX ORTHOPAEDIC Bilateral 2007    ligament and cartilige sx on knee - and an arthroscopy    HX UROLOGICAL  12/06/2017    [facility] - Incision of Urethral Stricture and Bladder Neck--Dr Gonsalo Wetzel     Current Outpatient Prescriptions   Medication Sig Dispense Refill    hyp ac-sod chl-sod sul-sod shae (ALEVICYN DERMAL) 0.009 % spry by Apply Externally route.  simvastatin (ZOCOR) 20 mg tablet Take 1 Tab by mouth nightly. 90 Tab 1    triamcinolone acetonide (KENALOG) 0.025 % topical cream Apply  to affected area two (2) times a day. use thin layer      aspirin delayed-release 81 mg tablet Take 81 mg by mouth. Indications: Patient states that his cardiologist told him 3 weeks ago to stop taking      acetaminophen (TYLENOL EXTRA STRENGTH) 500 mg tablet Take  by mouth every six (6) hours as needed for Pain.  MULTIVITAMIN/IRON/FOLIC ACID (CENTRUM COMPLETE PO) Take  by mouth.        No Known Allergies  Family History   Problem Relation Age of Onset    Diabetes Mother     Glaucoma Mother     Diabetes Brother     No Known Problems Father      Social History   Substance Use Topics    Smoking status: Former Smoker     Packs/day: 0.25     Types: Cigarettes     Quit date: 3/1/2017    Smokeless tobacco: Former User     Quit date: 2/15/2017    Alcohol use 1.2 oz/week     2 Cans of beer per week      Comment: occasional     Patient Active Problem List   Diagnosis Code    Benign non-nodular prostatic hyperplasia without lower urinary tract symptoms N40.0    Mixed hyperlipidemia E78.2    Primary osteoarthritis of both knees M17.0    Acute bronchitis J20.9    Atrial fibrillation (HCC) I48.91       Depression Risk Factor Screening:     PHQ over the last two weeks 2/12/2018   Little interest or pleasure in doing things Not at all   Feeling down, depressed or hopeless Not at all   Total Score PHQ 2 0     Alcohol Risk Factor Screening: You do not drink alcohol or very rarely. Functional Ability and Level of Safety:   Hearing Loss  Hearing is good. Activities of Daily Living  The home contains: handrails and rugs  Patient does total self care    Fall Risk  Fall Risk Assessment, last 12 mths 2/12/2018   Able to walk? Yes   Fall in past 12 months? No       Abuse Screen  Patient is not abused    Cognitive Screening   Evaluation of Cognitive Function:  Has your family/caregiver stated any concerns about your memory: no  Normal    Patient Care Team   Patient Care Team:  Laurel Barfield MD as PCP - General (Internal Medicine)  Niurka Han RN as Ambulatory Care Navigator  Franklyn Gonsales LPN as Vivia Baumgarten [address] Ok Punter, Urology  Assessment/Plan   Education and counseling provided:  Are appropriate based on today's review and evaluation  End-of-Life planning (with patient's consent), discussed AMD, he will complete later  Pneumococcal Vaccine, PPSV 23 given today  Colorectal cancer screening tests, ref for Colonoscopy placed      Diagnoses and all orders for this visit:    1.  Medicare annual wellness visit, subsequent    2. Colon cancer screening  -     REFERRAL TO GASTROENTEROLOGY    3. Encounter for immunization  -     Pneumococcal Admin ()  -     Pneumococcal Polysaccharide Vaccine    4. Mixed hyperlipidemia  -     CBC WITH AUTOMATED DIFF; Future  -     LIPID PANEL; Future  -     METABOLIC PANEL, COMPREHENSIVE; Future    5.  Chronic atrial fibrillation ([facility] [address] stable, followed by Cardiology        Health Maintenance Due   Topic Date Due    COLONOSCOPY  01/07/2018    MEDICARE YEARLY EXAM  02/01/2018

## 2018-02-12 NOTE — PROGRESS NOTES
Tiffany Jimenez is a 67 y.o. male (: 1945) presenting to address:    Chief Complaint   Patient presents with    Annual Wellness Visit    Immunization/Injection     Pneumovaccine       Vitals:    18 0858   BP: 100/70   Pulse: (!) 51   Resp: 20   Temp: 97.8 °F (36.6 °C)   TempSrc: Oral   SpO2: 95%   Weight: 252 lb 6.4 oz (114.5 kg)   Height: 5' 10\" (1.778 m)   PainSc:   3   PainLoc: Knee       Hearing/Vision:      Visual Acuity Screening    Right eye Left eye Both eyes   Without correction:      With correction: 20/25 20/25-2 20/25-2       Learning Assessment:   No flowsheet data found. Depression Screening:     PHQ over the last two weeks 2018   Little interest or pleasure in doing things Not at all   Feeling down, depressed or hopeless Not at all   Total Score PHQ 2 0     Fall Risk Assessment:     Fall Risk Assessment, last 12 mths 2018   Able to walk? Yes   Fall in past 12 months? No     Abuse Screening:     Abuse Screening Questionnaire 2018   Do you ever feel afraid of your partner? N   Are you in a relationship with someone who physically or mentally threatens you? N   Is it safe for you to go home? Y     Coordination of Care Questionaire:   1. Have you been to the ER, urgent care clinic since your last visit? Hospitalized since your last visit? YES [facility] er    2. Have you seen or consulted any other health care providers outside of the [address] [place] [address] since your last visit? Include any pap smears or colon screening. YES derm    Advanced Directive:   1. Do you have an Advanced Directive? NO    2. Would you like information on Advanced Directives?  NO

## 2018-03-06 ENCOUNTER — HOSPITAL ENCOUNTER (OUTPATIENT)
Dept: LAB | Age: 73
Discharge: HOME | End: 2018-03-06
Payer: MEDICARE

## 2018-03-06 DIAGNOSIS — E78.2 MIXED HYPERLIPIDEMIA: ICD-10-CM

## 2018-03-06 LAB
ALBUMIN SERPL-MCNC: 3.8 G/DL (ref 3.4–5)
ALBUMIN/GLOB SERPL: 1.6 {RATIO} (ref 0.8–1.7)
ALP SERPL-CCNC: 78 U/L (ref 45–117)
ALT SERPL-CCNC: 22 U/L (ref 16–61)
ANION GAP SERPL CALC-SCNC: 7 MMOL/L (ref 3–18)
AST SERPL-CCNC: 15 U/L (ref 15–37)
BASOPHILS # BLD: 0 K/UL (ref 0–0.06)
BASOPHILS NFR BLD: 0 % (ref 0–2)
BILIRUB SERPL-MCNC: 0.7 MG/DL (ref 0.2–1)
BUN SERPL-MCNC: 16 MG/DL (ref 7–18)
BUN/CREAT SERPL: 19 (ref 12–20)
CALCIUM SERPL-MCNC: 8.5 MG/DL (ref 8.5–10.1)
CHLORIDE SERPL-SCNC: 108 MMOL/L (ref 100–108)
CHOLEST SERPL-MCNC: 134 MG/DL
CO2 SERPL-SCNC: 27 MMOL/L (ref 21–32)
CREAT SERPL-MCNC: 0.84 MG/DL (ref 0.6–1.3)
DIFFERENTIAL METHOD BLD: ABNORMAL
EOSINOPHIL # BLD: 0.2 K/UL (ref 0–0.4)
EOSINOPHIL NFR BLD: 2 % (ref 0–5)
ERYTHROCYTE [DISTWIDTH] IN BLOOD BY AUTOMATED COUNT: 12.9 % (ref 11.6–14.5)
GLOBULIN SER CALC-MCNC: 2.4 G/DL (ref 2–4)
GLUCOSE SERPL-MCNC: 77 MG/DL (ref 74–99)
HCT VFR BLD AUTO: 50.3 % (ref 36–48)
HDLC SERPL-MCNC: 47 MG/DL (ref 40–60)
HDLC SERPL: 2.9 {RATIO} (ref 0–5)
HGB BLD-MCNC: 16.9 G/DL (ref 13–16)
LDLC SERPL CALC-MCNC: 59.4 MG/DL (ref 0–100)
LIPID PROFILE,FLP: NORMAL
LYMPHOCYTES # BLD: 2.3 K/UL (ref 0.9–3.6)
LYMPHOCYTES NFR BLD: 35 % (ref 21–52)
MCH RBC QN AUTO: 32.5 PG (ref 24–34)
MCHC RBC AUTO-ENTMCNC: 33.6 G/DL (ref 31–37)
MCV RBC AUTO: 96.7 FL (ref 74–97)
MONOCYTES # BLD: 0.5 K/UL (ref 0.05–1.2)
MONOCYTES NFR BLD: 7 % (ref 3–10)
NEUTS SEG # BLD: 3.7 K/UL (ref 1.8–8)
NEUTS SEG NFR BLD: 56 % (ref 40–73)
PLATELET # BLD AUTO: 197 K/UL (ref 135–420)
PMV BLD AUTO: 11.2 FL (ref 9.2–11.8)
POTASSIUM SERPL-SCNC: 4.1 MMOL/L (ref 3.5–5.5)
PROT SERPL-MCNC: 6.2 G/DL (ref 6.4–8.2)
RBC # BLD AUTO: 5.2 M/UL (ref 4.7–5.5)
SODIUM SERPL-SCNC: 142 MMOL/L (ref 136–145)
TRIGL SERPL-MCNC: 138 MG/DL (ref ?–150)
VLDLC SERPL CALC-MCNC: 27.6 MG/DL
WBC # BLD AUTO: 6.6 K/UL (ref 4.6–13.2)

## 2018-03-06 PROCEDURE — 80053 COMPREHEN METABOLIC PANEL: CPT | Performed by: INTERNAL MEDICINE

## 2018-03-06 PROCEDURE — 80061 LIPID PANEL: CPT | Performed by: INTERNAL MEDICINE

## 2018-03-06 PROCEDURE — 85025 COMPLETE CBC W/AUTO DIFF WBC: CPT | Performed by: INTERNAL MEDICINE

## 2018-03-06 PROCEDURE — 36415 COLL VENOUS BLD VENIPUNCTURE: CPT | Performed by: INTERNAL MEDICINE

## 2018-03-06 NOTE — LETTER
3/13/2018 8:49 AM 
 
Mr. Maria Guadalupe Braxton [address] [place] [id number] Dear Maria Guadalupe Braxton: 
 
Please find your most recent results below. Resulted Orders CBC WITH AUTOMATED DIFF Result Value Ref Range WBC 6.6 4.6 - 13.2 K/uL  
 RBC 5.20 4.70 - 5.50 M/uL  
 HGB 16.9 (H) 13.0 - 16.0 g/dL HCT 50.3 (H) 36.0 - 48.0 % MCV 96.7 74.0 - 97.0 FL  
 MCH 32.5 24.0 - 34.0 PG  
 MCHC 33.6 31.0 - 37.0 g/dL  
 RDW 12.9 11.6 - 14.5 % PLATELET 530 888 - 860 K/uL MPV 11.2 9.2 - 11.8 FL  
 NEUTROPHILS 56 40 - 73 % LYMPHOCYTES 35 21 - 52 % MONOCYTES 7 3 - 10 % EOSINOPHILS 2 0 - 5 % BASOPHILS 0 0 - 2 %  
 ABS. NEUTROPHILS 3.7 1.8 - 8.0 K/UL  
 ABS. LYMPHOCYTES 2.3 0.9 - 3.6 K/UL  
 ABS. MONOCYTES 0.5 0.05 - 1.2 K/UL  
 ABS. EOSINOPHILS 0.2 0.0 - 0.4 K/UL  
 ABS. BASOPHILS 0.0 0.0 - 0.06 K/UL  
 DF AUTOMATED    
LIPID PANEL Result Value Ref Range LIPID PROFILE Cholesterol, total 134 <200 MG/DL Triglyceride 138 <150 MG/DL Comment:  
   The drugs N-acetylcysteine (NAC) and 
Metamiszole have been found to cause falsely 
low results in this chemical assay. Please 
be sure to submit blood samples obtained BEFORE administration of either of these 
drugs to assure correct results. HDL Cholesterol 47 40 - 60 MG/DL  
 LDL, calculated 59.4 0 - 100 MG/DL VLDL, calculated 27.6 MG/DL  
 CHOL/HDL Ratio 2.9 0 - 5.0 METABOLIC PANEL, COMPREHENSIVE Result Value Ref Range Sodium 142 136 - 145 mmol/L Potassium 4.1 3.5 - 5.5 mmol/L Chloride 108 100 - 108 mmol/L  
 CO2 27 21 - 32 mmol/L Anion gap 7 3.0 - 18 mmol/L Glucose 77 74 - 99 mg/dL BUN 16 7.0 - 18 MG/DL Creatinine 0.84 0.6 - 1.3 MG/DL  
 BUN/Creatinine ratio 19 12 - 20 GFR est AA >60 >60 ml/min/1.73m2 GFR est non-AA >60 >60 ml/min/1.73m2 Comment:  
   (NOTE) Estimated GFR is calculated using the Modification of Diet in Renal  
 Disease (MDRD) Study equation, reported for both  Americans [facility]) and non- Americans (GFRNA), and normalized to 1.73m2  
body surface area. The physician must decide which value applies to  
the patient. The MDRD study equation should only be used in  
individuals age 25 or older. It has not been validated for the  
following: pregnant women, patients with serious comorbid conditions,  
or on certain medications, or persons with extremes of body size,  
muscle mass, or nutritional status. Calcium 8.5 8.5 - 10.1 MG/DL Bilirubin, total 0.7 0.2 - 1.0 MG/DL  
 ALT (SGPT) 22 16 - 61 U/L  
 AST (SGOT) 15 15 - 37 U/L Alk. phosphatase 78 45 - 117 U/L Protein, total 6.2 (L) 6.4 - 8.2 g/dL Albumin 3.8 3.4 - 5.0 g/dL Globulin 2.4 2.0 - 4.0 g/dL A-G Ratio 1.6 0.8 - 1.7 RECOMMENDATIONS: 
Cholesterol is very good Continue zocor Follow up in 6 mos Please call me if you have any questions: [phone] Sincerely, [facility]

## 2018-03-13 NOTE — PROGRESS NOTES
Attempted to contact patient but no answer, message left on VM. Will try again later.  Thank you  Army Frank LPN

## 2018-04-18 PROBLEM — E66.01 SEVERE OBESITY (BMI 35.0-39.9) WITH COMORBIDITY (HCC): Status: ACTIVE | Noted: 2018-04-18

## 2018-07-12 ENCOUNTER — OFFICE VISIT (OUTPATIENT)
Dept: FAMILY MEDICINE CLINIC | Age: 73
End: 2018-07-12

## 2018-07-12 VITALS
BODY MASS INDEX: 35.82 KG/M2 | OXYGEN SATURATION: 94 % | DIASTOLIC BLOOD PRESSURE: 61 MMHG | HEART RATE: 52 BPM | RESPIRATION RATE: 16 BRPM | SYSTOLIC BLOOD PRESSURE: 101 MMHG | WEIGHT: 250.2 LBS | HEIGHT: 70 IN | TEMPERATURE: 97.8 F

## 2018-07-12 DIAGNOSIS — E78.2 MIXED HYPERLIPIDEMIA: Primary | ICD-10-CM

## 2018-07-12 DIAGNOSIS — E66.01 SEVERE OBESITY (BMI 35.0-39.9) WITH COMORBIDITY (HCC): ICD-10-CM

## 2018-07-12 RX ORDER — SIMVASTATIN 20 MG/1
20 TABLET, FILM COATED ORAL
Qty: 90 TAB | Refills: 1 | Status: SHIPPED | OUTPATIENT
Start: 2018-07-12

## 2018-07-12 RX ORDER — SILVER SULFADIAZINE 10 G/1000G
CREAM TOPICAL
COMMUNITY
Start: 2018-06-16

## 2018-07-12 NOTE — PROGRESS NOTES
Margret Phalen is a 67 y.o. male (: 1945) presenting to address:    Chief Complaint   Patient presents with    Medication Evaluation       Vitals:    18 0930   BP: 101/61   Pulse: (!) 52   Resp: 16   Temp: 97.8 °F (36.6 °C)   TempSrc: Oral   SpO2: 94%   Weight: 250 lb 3.2 oz (113.5 kg)   Height: 5' 10\" (1.778 m)   PainSc:   0 - No pain       Hearing/Vision:   No exam data present    Learning Assessment:   No flowsheet data found. Depression Screening:     PHQ over the last two weeks 2018   Little interest or pleasure in doing things Not at all   Feeling down, depressed or hopeless Not at all   Total Score PHQ 2 0     Fall Risk Assessment:     Fall Risk Assessment, last 12 mths 2018   Able to walk? Yes   Fall in past 12 months? No     Abuse Screening:     Abuse Screening Questionnaire 2018   Do you ever feel afraid of your partner? N   Are you in a relationship with someone who physically or mentally threatens you? N   Is it safe for you to go home? Y     Coordination of Care Questionaire:   1. Have you been to the ER, urgent care clinic since your last visit? Hospitalized since your last visit? NO    2. Have you seen or consulted any other health care providers outside of the [address] [place] [address] since your last visit? Include any pap smears or colon screening. NO    Advanced Directive:   1. Do you have an Advanced Directive? NO    2. Would you like information on Advanced Directives?  NO

## 2018-07-12 NOTE — PROGRESS NOTES
HISTORY OF PRESENT ILLNESS  Levar Cornelius is a 67 y.o. male. HPI  HLD, stable, on zocor daily, last ldl was 59, need refills  Obesity, not controlled, BMI 35, discussed diet/exercise, he stated that he tried nutri-system before and lost weight on it, he wants to re start it and lose weight that way, discussed low carbs diet today, will monitor weight  Review of Systems   Respiratory: Negative for shortness of breath. Cardiovascular: Negative for chest pain, palpitations and leg swelling. Physical Exam   Constitutional: He appears well-developed and well-nourished. Neck: Neck supple. Cardiovascular: Normal rate and normal heart sounds. An irregularly irregular rhythm present. Pulmonary/Chest: Effort normal and breath sounds normal. He has no rales. Vitals reviewed. ASSESSMENT and PLAN  Diagnoses and all orders for this visit:    1. Mixed hyperlipidemia, stable  -     simvastatin (ZOCOR) 20 mg tablet; Take 1 Tab by mouth nightly. 2. Severe obesity (BMI 35.0-39. 9) with comorbidity ([address] see above, he will start diet program, and will monitor weight    He is moving to [address] [place] [address] and will establish with new pcp soon    Lab Results   Component Value Date/Time    Cholesterol, total 134 03/06/2018 07:43 AM    HDL Cholesterol 47 03/06/2018 07:43 AM    LDL, calculated 59.4 03/06/2018 07:43 AM    VLDL, calculated 27.6 03/06/2018 07:43 AM    Triglyceride 138 03/06/2018 07:43 AM    CHOL/HDL Ratio 2.9 03/06/2018 07:43 AM

## 2018-11-26 ENCOUNTER — PATIENT OUTREACH (OUTPATIENT)
Dept: FAMILY MEDICINE CLINIC | Age: 73
End: 2018-11-26